# Patient Record
Sex: FEMALE | Race: WHITE | NOT HISPANIC OR LATINO | Employment: FULL TIME | URBAN - NONMETROPOLITAN AREA
[De-identification: names, ages, dates, MRNs, and addresses within clinical notes are randomized per-mention and may not be internally consistent; named-entity substitution may affect disease eponyms.]

---

## 2017-01-12 PROBLEM — N20.1 LEFT URETERAL CALCULUS: Status: ACTIVE | Noted: 2017-01-12

## 2017-03-06 DIAGNOSIS — N20.1 LEFT URETERAL STONE: ICD-10-CM

## 2017-03-06 LAB
PARATHYROID HORMONE INTACT: 47.5 PG/ML (ref 15–65)
URIC ACID, SERUM: 2.9 MG/DL (ref 2.4–5.7)

## 2018-07-11 ENCOUNTER — TRANSCRIBE ORDERS (OUTPATIENT)
Dept: ADMINISTRATIVE | Facility: HOSPITAL | Age: 48
End: 2018-07-11

## 2018-07-11 DIAGNOSIS — R30.0 DYSURIA: ICD-10-CM

## 2018-07-11 DIAGNOSIS — R10.811 RIGHT UPPER QUADRANT ABDOMINAL TENDERNESS, REBOUND TENDERNESS PRESENCE NOT SPECIFIED: Primary | ICD-10-CM

## 2018-07-12 ENCOUNTER — HOSPITAL ENCOUNTER (OUTPATIENT)
Dept: ULTRASOUND IMAGING | Facility: HOSPITAL | Age: 48
Discharge: HOME | End: 2018-07-12
Admitting: PHYSICIAN ASSISTANT

## 2018-07-12 ENCOUNTER — HOSPITAL ENCOUNTER (OUTPATIENT)
Dept: NUCLEAR MEDICINE | Facility: HOSPITAL | Age: 48
Discharge: HOME | End: 2018-07-12

## 2018-07-12 DIAGNOSIS — R30.0 DYSURIA: ICD-10-CM

## 2018-07-12 DIAGNOSIS — R10.811 RIGHT UPPER QUADRANT ABDOMINAL TENDERNESS, REBOUND TENDERNESS PRESENCE NOT SPECIFIED: ICD-10-CM

## 2018-07-12 PROCEDURE — 78226 HEPATOBILIARY SYSTEM IMAGING: CPT

## 2018-07-12 PROCEDURE — 0 TECHNETIUM TC 99M MEBROFENIN KIT: Performed by: PHYSICIAN ASSISTANT

## 2018-07-12 PROCEDURE — A9537 TC99M MEBROFENIN: HCPCS | Performed by: PHYSICIAN ASSISTANT

## 2018-07-12 PROCEDURE — 76705 ECHO EXAM OF ABDOMEN: CPT

## 2018-07-12 RX ORDER — KIT FOR THE PREPARATION OF TECHNETIUM TC 99M MEBROFENIN 45 MG/10ML
1 INJECTION, POWDER, LYOPHILIZED, FOR SOLUTION INTRAVENOUS
Status: COMPLETED | OUTPATIENT
Start: 2018-07-12 | End: 2018-07-12

## 2018-07-12 RX ADMIN — MEBROFENIN 1 DOSE: 45 INJECTION, POWDER, LYOPHILIZED, FOR SOLUTION INTRAVENOUS at 15:00

## 2018-08-23 ENCOUNTER — APPOINTMENT (OUTPATIENT)
Dept: PREADMISSION TESTING | Facility: HOSPITAL | Age: 48
End: 2018-08-23

## 2018-08-23 VITALS
OXYGEN SATURATION: 98 % | DIASTOLIC BLOOD PRESSURE: 73 MMHG | WEIGHT: 183.2 LBS | RESPIRATION RATE: 18 BRPM | HEART RATE: 80 BPM | HEIGHT: 63 IN | BODY MASS INDEX: 32.46 KG/M2 | SYSTOLIC BLOOD PRESSURE: 111 MMHG

## 2018-08-23 LAB
ALBUMIN SERPL-MCNC: 4.1 G/DL (ref 3.5–5)
ALBUMIN/GLOB SERPL: 1.6 G/DL (ref 1.1–2.5)
ALP SERPL-CCNC: 49 U/L (ref 24–120)
ALT SERPL W P-5'-P-CCNC: 25 U/L (ref 0–54)
AMYLASE SERPL-CCNC: 65 U/L (ref 30–110)
ANION GAP SERPL CALCULATED.3IONS-SCNC: 6 MMOL/L (ref 4–13)
AST SERPL-CCNC: 17 U/L (ref 7–45)
BASOPHILS # BLD AUTO: 0.07 10*3/MM3 (ref 0–0.2)
BASOPHILS NFR BLD AUTO: 0.8 % (ref 0–2)
BILIRUB SERPL-MCNC: 0.3 MG/DL (ref 0.1–1)
BUN BLD-MCNC: 8 MG/DL (ref 5–21)
BUN/CREAT SERPL: 11.9 (ref 7–25)
CALCIUM SPEC-SCNC: 9 MG/DL (ref 8.4–10.4)
CHLORIDE SERPL-SCNC: 107 MMOL/L (ref 98–110)
CO2 SERPL-SCNC: 27 MMOL/L (ref 24–31)
CREAT BLD-MCNC: 0.67 MG/DL (ref 0.5–1.4)
DEPRECATED RDW RBC AUTO: 42 FL (ref 40–54)
EOSINOPHIL # BLD AUTO: 0.48 10*3/MM3 (ref 0–0.7)
EOSINOPHIL NFR BLD AUTO: 5.4 % (ref 0–4)
ERYTHROCYTE [DISTWIDTH] IN BLOOD BY AUTOMATED COUNT: 13 % (ref 12–15)
GFR SERPL CREATININE-BSD FRML MDRD: 94 ML/MIN/1.73
GLOBULIN UR ELPH-MCNC: 2.6 GM/DL
GLUCOSE BLD-MCNC: 118 MG/DL (ref 70–100)
HCT VFR BLD AUTO: 37.4 % (ref 37–47)
HGB BLD-MCNC: 13.3 G/DL (ref 12–16)
IMM GRANULOCYTES # BLD: 0.03 10*3/MM3 (ref 0–0.03)
IMM GRANULOCYTES NFR BLD: 0.3 % (ref 0–5)
LIPASE SERPL-CCNC: 147 U/L (ref 23–203)
LYMPHOCYTES # BLD AUTO: 3.07 10*3/MM3 (ref 0.72–4.86)
LYMPHOCYTES NFR BLD AUTO: 34.8 % (ref 15–45)
MCH RBC QN AUTO: 31.4 PG (ref 28–32)
MCHC RBC AUTO-ENTMCNC: 35.6 G/DL (ref 33–36)
MCV RBC AUTO: 88.2 FL (ref 82–98)
MONOCYTES # BLD AUTO: 0.44 10*3/MM3 (ref 0.19–1.3)
MONOCYTES NFR BLD AUTO: 5 % (ref 4–12)
NEUTROPHILS # BLD AUTO: 4.74 10*3/MM3 (ref 1.87–8.4)
NEUTROPHILS NFR BLD AUTO: 53.7 % (ref 39–78)
NRBC BLD MANUAL-RTO: 0 /100 WBC (ref 0–0)
PLATELET # BLD AUTO: 286 10*3/MM3 (ref 130–400)
PMV BLD AUTO: 9.2 FL (ref 6–12)
POTASSIUM BLD-SCNC: 3.6 MMOL/L (ref 3.5–5.3)
PROT SERPL-MCNC: 6.7 G/DL (ref 6.3–8.7)
RBC # BLD AUTO: 4.24 10*6/MM3 (ref 4.2–5.4)
SODIUM BLD-SCNC: 140 MMOL/L (ref 135–145)
WBC NRBC COR # BLD: 8.83 10*3/MM3 (ref 4.8–10.8)

## 2018-08-23 PROCEDURE — 85025 COMPLETE CBC W/AUTO DIFF WBC: CPT | Performed by: SPECIALIST

## 2018-08-23 PROCEDURE — 36415 COLL VENOUS BLD VENIPUNCTURE: CPT

## 2018-08-23 PROCEDURE — 83690 ASSAY OF LIPASE: CPT | Performed by: SPECIALIST

## 2018-08-23 PROCEDURE — 82150 ASSAY OF AMYLASE: CPT | Performed by: SPECIALIST

## 2018-08-23 PROCEDURE — 80053 COMPREHEN METABOLIC PANEL: CPT | Performed by: SPECIALIST

## 2018-08-23 RX ORDER — DEXLANSOPRAZOLE 30 MG/1
30 CAPSULE, DELAYED RELEASE ORAL DAILY
COMMUNITY

## 2018-08-23 RX ORDER — DEXTROAMPHETAMINE SACCHARATE, AMPHETAMINE ASPARTATE, DEXTROAMPHETAMINE SULFATE AND AMPHETAMINE SULFATE 5; 5; 5; 5 MG/1; MG/1; MG/1; MG/1
20 TABLET ORAL DAILY
COMMUNITY

## 2018-08-23 RX ORDER — TOPIRAMATE 50 MG/1
50 TABLET, FILM COATED ORAL DAILY
COMMUNITY

## 2018-08-23 RX ORDER — CITALOPRAM 40 MG/1
40 TABLET ORAL DAILY
COMMUNITY

## 2018-08-23 RX ORDER — LEVOTHYROXINE SODIUM 0.07 MG/1
75 TABLET ORAL DAILY
COMMUNITY

## 2018-08-23 NOTE — DISCHARGE INSTRUCTIONS
DAY OF SURGERY INSTRUCTIONS        YOUR SURGEON: dr mcallister    PROCEDURE: ***LAPAROSCOPIC CHOLECYSTECTOMY WITH CHOLANGIOGRAM  LAPAROSCOPIC APPENDECTOMY    DATE OF SURGERY: ***8/31/2018    ARRIVAL TIME: AS DIRECTED BY OFFICE    DAY OF SURGERY TAKE ONLY THESE MEDICATIONS UNLESS OTHERWISE INSTRUCTED BY YOUR PHYSICIAN: ***none        MANAGING PAIN AFTER SURGERY    We know you are probably wondering what your pain will be like after surgery.  Following surgery it is unrealistic to expect you will not have pain.   Pain is how our bodies let us know that something is wrong or cautions us to be careful.  That said, our goal is to make your pain tolerable.    Methods we may use to treat your pain include (oral or IV medications, PCAs, epidurals, nerve blocks, etc.)   While some procedures require IV pain medications for a short time after surgery, transitioning to pain medications by mouth allows for better management of pain.   Your nurse will encourage you to take oral pain medications whenever possible.  IV medications work almost immediately, but only last a short while.  Taking medications by mouth allows for a more constant level of medication in your blood stream for a longer period of time.      Once your pain is out of control it is harder to get back under control.  It is important you are aware when your next dose of pain medication is due.  If you are admitted, your nurse may write the time of your next dose on the white board in your room to help you remember.      We are interested in your pain and encourage you to inform us about aggravating factors during your visit.   Many times a simple repositioning every few hours can make a big difference.    If your physician says it is okay, do not let your pain prevent you from getting out of bed. Be sure to call your nurse for assistance prior to getting up so you do not fall.      Before surgery, please decide your tolerable pain goal.  These faces help describe  the pain ratings we use on a 0-10 scale.   Be prepared to tell us your goal and whether or not you take pain or anxiety medications at home.          BEFORE YOU COME TO THE HOSPITAL  (Pre-op instructions)  • Do not eat, drink, smoke or chew gum after midnight the night before surgery.  This also includes no mints.  • Morning of surgery take only the medicines you have been instructed with a sip of water unless otherwise instructed  by your physician.  • Do not shave, wear makeup or dark nail polish.  • Remove all jewelry including rings.  • Leave anything you consider valuable at home.  • Leave your suitcase in the car until after your surgery.  • Bring the following with you if applicable:  o Picture ID and insurance, Medicare or Medicaid cards  o Co-pay/deductible required by insurance (cash, check, credit card)  o Copy of advance directive, living will or power-of- documents if not brought to PAT  o CPAP or BIPAP mask and tubing  o Relaxation aids (MP3 player, book, magazine)  • On the day of surgery check in at registration located at the main entrance of the hospital.       Outpatient Surgery Guidelines, Adult  Outpatient procedures are those for which the person having the procedure is allowed to go home the same day as the procedure. Various procedures are done on an outpatient basis. You should follow some general guidelines if you will be having an outpatient procedure.  LET YOUR HEALTH CARE PROVIDER KNOW ABOUT:  · Any allergies you have.  · All medicines you are taking, including vitamins, herbs, eye drops, creams, and over-the-counter medicines.  · Previous problems you or members of your family have had with the use of anesthetics.  · Any blood disorders you have.  · Previous surgeries you have had.  · Medical conditions you have.  RISKS AND COMPLICATIONS  Your health care provider will discuss possible risks and complications with you before surgery. Common risks and complications include:     · Problems due to the use of anesthetics.  · Blood loss and replacement (does not apply to minor surgical procedures).  · Temporary increase in pain due to surgery.  · Uncorrected pain or problems that the surgery was meant to correct.  · Infection.  · New damage.  BEFORE THE PROCEDURE  · Ask your health care provider about changing or stopping your regular medicines. You may need to stop taking certain medicines in the days or weeks before the procedure.  · Stop smoking at least 2 weeks before surgery. This lowers your risk for complications during and after surgery. Ask your health care provider for help with this if needed.  · Eat your usual meals and a light supper the day before surgery. Continue fluid intake. Do not drink alcohol.  · Do not eat or drink after midnight the night before your surgery.   · Arrange for someone to take you home and to stay with you for 24 hours after the procedure. Medicine given for your procedure may affect your ability to drive or to care for yourself.  · Call your health care provider's office if you develop an illness or problem that may prevent you from safely having your procedure.  AFTER THE PROCEDURE  After surgery, you will be taken to a recovery area, where your progress will be monitored. If there are no complications, you will be allowed to go home when you are awake, stable, and taking fluids well. You may have numbness around the surgical site. Healing will take some time. You will have tenderness at the surgical site and may have some swelling and bruising. You may also have some nausea.  HOME CARE INSTRUCTIONS  · Do not drive for 24 hours, or as directed by your health care provider. Do not drive while taking prescription pain medicines.  · Do not drink alcohol for 24 hours.  · Do not make important decisions or sign legal documents for 24 hours.  · You may resume a normal diet and activities as directed.  · Do not lift anything heavier than 10 pounds (4.5 kg) or  play contact sports until your health care provider says it is okay.  · Change your bandages (dressings) as directed.  · Only take over-the-counter or prescription medicines as directed by your health care provider.  · Follow up with your health care provider as directed.  SEEK MEDICAL CARE IF:  · You have increased bleeding (more than a small spot) from the surgical site.  · You have redness, swelling, or increasing pain in the wound.  · You see pus coming from the wound.  · You have a fever.  · You notice a bad smell coming from the wound or dressing.  · You feel lightheaded or faint.  · You develop a rash.  · You have trouble breathing.  · You develop allergies.  MAKE SURE YOU:  · Understand these instructions.  · Will watch your condition.  · Will get help right away if you are not doing well or get worse.     This information is not intended to replace advice given to you by your health care provider. Make sure you discuss any questions you have with your health care provider.     Document Released: 09/12/2002 Document Revised: 05/03/2016 Document Reviewed: 05/22/2014  [employer] Interactive Patient Education ©2016 [employer]       Fall Prevention in Hospitals, Adult  As a hospital patient, your condition and the treatments you receive can increase your risk for falls. Some additional risk factors for falls in a hospital include:  · Being in an unfamiliar environment.  · Being on bed rest.  · Your surgery.  · Taking certain medicines.  · Your tubing requirements, such as intravenous (IV) therapy or catheters.  It is important that you learn how to decrease fall risks while at the hospital. Below are important tips that can help prevent falls.  SAFETY TIPS FOR PREVENTING FALLS  Talk about your risk of falling.  · Ask your health care provider why you are at risk for falling. Is it your medicine, illness, tubing placement, or something else?  · Make a plan with your health care provider to keep you safe from  falls.  · Ask your health care provider or pharmacist about side effects of your medicines. Some medicines can make you dizzy or affect your coordination.  Ask for help.  · Ask for help before getting out of bed. You may need to press your call button.  · Ask for assistance in getting safely to the toilet.  · Ask for a walker or cane to be put at your bedside. Ask that most of the side rails on your bed be placed up before your health care provider leaves the room.  · Ask family or friends to sit with you.  · Ask for things that are out of your reach, such as your glasses, hearing aids, telephone, bedside table, or call button.  Follow these tips to avoid falling:  · Stay lying or seated, rather than standing, while waiting for help.  · Wear rubber-soled slippers or shoes whenever you walk in the hospital.  · Avoid quick, sudden movements.  ¨ Change positions slowly.  ¨ Sit on the side of your bed before standing.  ¨ Stand up slowly and wait before you start to walk.  · Let your health care provider know if there is a spill on the floor.  · Pay careful attention to the medical equipment, electrical cords, and tubes around you.  · When you need help, use your call button by your bed or in the bathroom. Wait for one of your health care providers to help you.  · If you feel dizzy or unsure of your footing, return to bed and wait for assistance.  · Avoid being distracted by the TV, telephone, or another person in your room.  · Do not lean or support yourself on rolling objects, such as IV poles or bedside tables.     This information is not intended to replace advice given to you by your health care provider. Make sure you discuss any questions you have with your health care provider.     Document Released: 12/15/2001 Document Revised: 01/08/2016 Document Reviewed: 08/25/2013  [employer] Interactive Patient Education ©2016 [employer]       Surgical Site Infections FAQs  What is a Surgical Site Infection (SSI)?  A  surgical site infection is an infection that occurs after surgery in the part of the body where the surgery took place. Most patients who have surgery do not develop an infection. However, infections develop in about 1 to 3 out of every 100 patients who have surgery.  Some of the common symptoms of a surgical site infection are:  · Redness and pain around the area where you had surgery  · Drainage of cloudy fluid from your surgical wound  · Fever  Can SSIs be treated?  Yes. Most surgical site infections can be treated with antibiotics. The antibiotic given to you depends on the bacteria (germs) causing the infection. Sometimes patients with SSIs also need another surgery to treat the infection.  What are some of the things that hospitals are doing to prevent SSIs?  To prevent SSIs, doctors, nurses, and other healthcare providers:  · Clean their hands and arms up to their elbows with an antiseptic agent just before the surgery.  · Clean their hands with soap and water or an alcohol-based hand rub before and after caring for each patient.  · May remove some of your hair immediately before your surgery using electric clippers if the hair is in the same area where the procedure will occur. They should not shave you with a razor.  · Wear special hair covers, masks, gowns, and gloves during surgery to keep the surgery area clean.  · Give you antibiotics before your surgery starts. In most cases, you should get antibiotics within 60 minutes before the surgery starts and the antibiotics should be stopped within 24 hours after surgery.  · Clean the skin at the site of your surgery with a special soap that kills germs.  What can I do to help prevent SSIs?  Before your surgery:  · Tell your doctor about other medical problems you may have. Health problems such as allergies, diabetes, and obesity could affect your surgery and your treatment.  · Quit smoking. Patients who smoke get more infections. Talk to your doctor about how  you can quit before your surgery.  · Do not shave near where you will have surgery. Shaving with a razor can irritate your skin and make it easier to develop an infection.  At the time of your surgery:  · Speak up if someone tries to shave you with a razor before surgery. Ask why you need to be shaved and talk with your surgeon if you have any concerns.  · Ask if you will get antibiotics before surgery.  After your surgery:  · Make sure that your healthcare providers clean their hands before examining you, either with soap and water or an alcohol-based hand rub.  · If you do not see your providers clean their hands, please ask them to do so.  · Family and friends who visit you should not touch the surgical wound or dressings.  · Family and friends should clean their hands with soap and water or an alcohol-based hand rub before and after visiting you. If you do not see them clean their hands, ask them to clean their hands.  What do I need to do when I go home from the hospital?  · Before you go home, your doctor or nurse should explain everything you need to know about taking care of your wound. Make sure you understand how to care for your wound before you leave the hospital.  · Always clean your hands before and after caring for your wound.  · Before you go home, make sure you know who to contact if you have questions or problems after you get home.  · If you have any symptoms of an infection, such as redness and pain at the surgery site, drainage, or fever, call your doctor immediately.  If you have additional questions, please ask your doctor or nurse.  Developed and co-sponsored by The Society for Healthcare Epidemiology of [place] (SHEA); Infectious Diseases Society of [place] (IDSA); American Hospital Association; Association for Professionals in Infection Control and Epidemiology (APIC); Centers for Disease Control and Prevention (CDC); and The Joint Commission.     This information is not intended to replace  advice given to you by your health care provider. Make sure you discuss any questions you have with your health care provider.     Document Released: 12/23/2014 Document Revised: 01/08/2016 Document Reviewed: 03/02/2016  [employer] Interactive Patient Education ©2016 [employer]       [facility]  CHG 4% Patient Instruction Sheet    Preparing the Skin Before Surgery  Preparing or “prepping” skin before surgery can reduce the risk of infection at the surgical site. To make the process easier,[facility] has chosen 4% Chlorhexidine Gluconate (CHG) antiseptic solution.   The steps below outline the prepping process and should be carefully followed.                                                                                                                                                      Prep the skin at the following time(s):                                                      We recommend you shower the night before surgery, and again the morning of surgery with the 4% CHG antiseptic solution using half of the bottle and a cloth each time.  Dress in clean clothes/sleepwear after showering.  See instructions below for application.          Do not apply any lotions or moisturizers.       Do not shave the area to be prepped for at least 2 days prior to surgery.    Clipping the hair may be done immediately prior to your surgery at the hospital    if needed.    Directions:  Thoroughly rinse your body with water.  Apply 4% CHG to a cloth and wash skin gently, paying special attention to the operative site.  Rinse again thoroughly.  Once you have begun using this product do not apply anything else to your skin. If itching or redness persists, rinse affected areas and discontinue use.    When using this product:  • Keep out of eyes, ears, and mouth.  • If solution should contact these areas, rinse out promptly and thoroughly with water.  • For external use only.  • Do not use in genital area, on your face or  head.      PATIENT/FAMILY/RESPONSIBLE PARTY VERBALIZES UNDERSTANDING OF ABOVE EDUCATION.  COPY OF PAIN SCALE GIVEN AND REVIEWED WITH VERBALIZED UNDERSTANDING.

## 2018-08-31 ENCOUNTER — ANESTHESIA (OUTPATIENT)
Dept: PERIOP | Facility: HOSPITAL | Age: 48
End: 2018-08-31

## 2018-08-31 ENCOUNTER — ANESTHESIA EVENT (OUTPATIENT)
Dept: PERIOP | Facility: HOSPITAL | Age: 48
End: 2018-08-31

## 2018-08-31 ENCOUNTER — HOSPITAL ENCOUNTER (OUTPATIENT)
Facility: HOSPITAL | Age: 48
Setting detail: HOSPITAL OUTPATIENT SURGERY
Discharge: HOME | End: 2018-08-31
Attending: SPECIALIST | Admitting: SPECIALIST

## 2018-08-31 VITALS
DIASTOLIC BLOOD PRESSURE: 66 MMHG | OXYGEN SATURATION: 97 % | TEMPERATURE: 98.7 F | HEART RATE: 89 BPM | SYSTOLIC BLOOD PRESSURE: 99 MMHG | RESPIRATION RATE: 16 BRPM

## 2018-08-31 DIAGNOSIS — R52 PAIN: ICD-10-CM

## 2018-08-31 LAB — B-HCG UR QL: NEGATIVE

## 2018-08-31 PROCEDURE — 25010000002 ONDANSETRON PER 1 MG: Performed by: NURSE ANESTHETIST, CERTIFIED REGISTERED

## 2018-08-31 PROCEDURE — 88304 TISSUE EXAM BY PATHOLOGIST: CPT | Performed by: SPECIALIST

## 2018-08-31 PROCEDURE — 25010000002 PROPOFOL 10 MG/ML EMULSION: Performed by: NURSE ANESTHETIST, CERTIFIED REGISTERED

## 2018-08-31 PROCEDURE — 25010000002 FENTANYL CITRATE (PF) 100 MCG/2ML SOLUTION: Performed by: ANESTHESIOLOGY

## 2018-08-31 PROCEDURE — 25010000002 DEXAMETHASONE PER 1 MG: Performed by: ANESTHESIOLOGY

## 2018-08-31 PROCEDURE — 25010000002 DEXAMETHASONE PER 1 MG: Performed by: NURSE ANESTHETIST, CERTIFIED REGISTERED

## 2018-08-31 PROCEDURE — 81025 URINE PREGNANCY TEST: CPT | Performed by: SPECIALIST

## 2018-08-31 PROCEDURE — 25010000002 ONDANSETRON PER 1 MG: Performed by: ANESTHESIOLOGY

## 2018-08-31 PROCEDURE — 25010000002 FENTANYL CITRATE (PF) 250 MCG/5ML SOLUTION: Performed by: NURSE ANESTHETIST, CERTIFIED REGISTERED

## 2018-08-31 PROCEDURE — 25010000002 MIDAZOLAM PER 1 MG: Performed by: ANESTHESIOLOGY

## 2018-08-31 RX ORDER — LIDOCAINE HYDROCHLORIDE 20 MG/ML
INJECTION, SOLUTION INFILTRATION; PERINEURAL AS NEEDED
Status: DISCONTINUED | OUTPATIENT
Start: 2018-08-31 | End: 2018-08-31 | Stop reason: SURG

## 2018-08-31 RX ORDER — ROCURONIUM BROMIDE 10 MG/ML
INJECTION, SOLUTION INTRAVENOUS AS NEEDED
Status: DISCONTINUED | OUTPATIENT
Start: 2018-08-31 | End: 2018-08-31 | Stop reason: SURG

## 2018-08-31 RX ORDER — SODIUM CHLORIDE 9 MG/ML
INJECTION, SOLUTION INTRAVENOUS AS NEEDED
Status: DISCONTINUED | OUTPATIENT
Start: 2018-08-31 | End: 2018-08-31 | Stop reason: HOSPADM

## 2018-08-31 RX ORDER — METOCLOPRAMIDE HYDROCHLORIDE 5 MG/ML
5 INJECTION INTRAMUSCULAR; INTRAVENOUS
Status: DISCONTINUED | OUTPATIENT
Start: 2018-08-31 | End: 2018-08-31 | Stop reason: HOSPADM

## 2018-08-31 RX ORDER — ACETAMINOPHEN 500 MG
1000 TABLET ORAL ONCE
Status: COMPLETED | OUTPATIENT
Start: 2018-08-31 | End: 2018-08-31

## 2018-08-31 RX ORDER — DEXAMETHASONE SODIUM PHOSPHATE 4 MG/ML
4 INJECTION, SOLUTION INTRA-ARTICULAR; INTRALESIONAL; INTRAMUSCULAR; INTRAVENOUS; SOFT TISSUE ONCE AS NEEDED
Status: COMPLETED | OUTPATIENT
Start: 2018-08-31 | End: 2018-08-31

## 2018-08-31 RX ORDER — ONDANSETRON 2 MG/ML
4 INJECTION INTRAMUSCULAR; INTRAVENOUS ONCE AS NEEDED
Status: COMPLETED | OUTPATIENT
Start: 2018-08-31 | End: 2018-08-31

## 2018-08-31 RX ORDER — LIDOCAINE HYDROCHLORIDE 40 MG/ML
SOLUTION TOPICAL AS NEEDED
Status: DISCONTINUED | OUTPATIENT
Start: 2018-08-31 | End: 2018-08-31 | Stop reason: SURG

## 2018-08-31 RX ORDER — HYDROCODONE BITARTRATE AND ACETAMINOPHEN 5; 325 MG/1; MG/1
1 TABLET ORAL ONCE AS NEEDED
Status: DISCONTINUED | OUTPATIENT
Start: 2018-08-31 | End: 2018-08-31 | Stop reason: HOSPADM

## 2018-08-31 RX ORDER — PROPOFOL 10 MG/ML
VIAL (ML) INTRAVENOUS AS NEEDED
Status: DISCONTINUED | OUTPATIENT
Start: 2018-08-31 | End: 2018-08-31 | Stop reason: SURG

## 2018-08-31 RX ORDER — SODIUM CHLORIDE, SODIUM LACTATE, POTASSIUM CHLORIDE, CALCIUM CHLORIDE 600; 310; 30; 20 MG/100ML; MG/100ML; MG/100ML; MG/100ML
1000 INJECTION, SOLUTION INTRAVENOUS CONTINUOUS
Status: DISCONTINUED | OUTPATIENT
Start: 2018-08-31 | End: 2018-08-31 | Stop reason: HOSPADM

## 2018-08-31 RX ORDER — SODIUM CHLORIDE 0.9 % (FLUSH) 0.9 %
1-10 SYRINGE (ML) INJECTION AS NEEDED
Status: DISCONTINUED | OUTPATIENT
Start: 2018-08-31 | End: 2018-08-31 | Stop reason: HOSPADM

## 2018-08-31 RX ORDER — MAGNESIUM HYDROXIDE 1200 MG/15ML
LIQUID ORAL AS NEEDED
Status: DISCONTINUED | OUTPATIENT
Start: 2018-08-31 | End: 2018-08-31 | Stop reason: HOSPADM

## 2018-08-31 RX ORDER — OXYCODONE AND ACETAMINOPHEN 10; 325 MG/1; MG/1
1 TABLET ORAL ONCE AS NEEDED
Status: COMPLETED | OUTPATIENT
Start: 2018-08-31 | End: 2018-08-31

## 2018-08-31 RX ORDER — OXYCODONE AND ACETAMINOPHEN 7.5; 325 MG/1; MG/1
2 TABLET ORAL ONCE AS NEEDED
Status: DISCONTINUED | OUTPATIENT
Start: 2018-08-31 | End: 2018-08-31 | Stop reason: HOSPADM

## 2018-08-31 RX ORDER — DEXAMETHASONE SODIUM PHOSPHATE 4 MG/ML
INJECTION, SOLUTION INTRA-ARTICULAR; INTRALESIONAL; INTRAMUSCULAR; INTRAVENOUS; SOFT TISSUE AS NEEDED
Status: DISCONTINUED | OUTPATIENT
Start: 2018-08-31 | End: 2018-08-31 | Stop reason: SURG

## 2018-08-31 RX ORDER — SODIUM CHLORIDE, SODIUM LACTATE, POTASSIUM CHLORIDE, CALCIUM CHLORIDE 600; 310; 30; 20 MG/100ML; MG/100ML; MG/100ML; MG/100ML
100 INJECTION, SOLUTION INTRAVENOUS CONTINUOUS
Status: DISCONTINUED | OUTPATIENT
Start: 2018-08-31 | End: 2018-08-31 | Stop reason: HOSPADM

## 2018-08-31 RX ORDER — FENTANYL CITRATE 50 UG/ML
25 INJECTION, SOLUTION INTRAMUSCULAR; INTRAVENOUS AS NEEDED
Status: DISCONTINUED | OUTPATIENT
Start: 2018-08-31 | End: 2018-08-31 | Stop reason: HOSPADM

## 2018-08-31 RX ORDER — LABETALOL HYDROCHLORIDE 5 MG/ML
5 INJECTION, SOLUTION INTRAVENOUS
Status: DISCONTINUED | OUTPATIENT
Start: 2018-08-31 | End: 2018-08-31 | Stop reason: HOSPADM

## 2018-08-31 RX ORDER — FENTANYL CITRATE 50 UG/ML
INJECTION, SOLUTION INTRAMUSCULAR; INTRAVENOUS AS NEEDED
Status: DISCONTINUED | OUTPATIENT
Start: 2018-08-31 | End: 2018-08-31 | Stop reason: SURG

## 2018-08-31 RX ORDER — ONDANSETRON 2 MG/ML
INJECTION INTRAMUSCULAR; INTRAVENOUS AS NEEDED
Status: DISCONTINUED | OUTPATIENT
Start: 2018-08-31 | End: 2018-08-31 | Stop reason: SURG

## 2018-08-31 RX ORDER — MIDAZOLAM HYDROCHLORIDE 1 MG/ML
2 INJECTION INTRAMUSCULAR; INTRAVENOUS
Status: DISCONTINUED | OUTPATIENT
Start: 2018-08-31 | End: 2018-08-31 | Stop reason: HOSPADM

## 2018-08-31 RX ORDER — MIDAZOLAM HYDROCHLORIDE 1 MG/ML
1 INJECTION INTRAMUSCULAR; INTRAVENOUS
Status: DISCONTINUED | OUTPATIENT
Start: 2018-08-31 | End: 2018-08-31 | Stop reason: HOSPADM

## 2018-08-31 RX ORDER — NALOXONE HCL 0.4 MG/ML
0.4 VIAL (ML) INJECTION AS NEEDED
Status: DISCONTINUED | OUTPATIENT
Start: 2018-08-31 | End: 2018-08-31 | Stop reason: HOSPADM

## 2018-08-31 RX ORDER — SODIUM CHLORIDE 0.9 % (FLUSH) 0.9 %
3 SYRINGE (ML) INJECTION AS NEEDED
Status: DISCONTINUED | OUTPATIENT
Start: 2018-08-31 | End: 2018-08-31 | Stop reason: HOSPADM

## 2018-08-31 RX ORDER — IBUPROFEN 600 MG/1
600 TABLET ORAL ONCE AS NEEDED
Status: DISCONTINUED | OUTPATIENT
Start: 2018-08-31 | End: 2018-08-31 | Stop reason: HOSPADM

## 2018-08-31 RX ORDER — FAMOTIDINE 10 MG/ML
20 INJECTION, SOLUTION INTRAVENOUS
Status: DISCONTINUED | OUTPATIENT
Start: 2018-08-31 | End: 2018-08-31 | Stop reason: HOSPADM

## 2018-08-31 RX ORDER — MEPERIDINE HYDROCHLORIDE 25 MG/ML
12.5 INJECTION INTRAMUSCULAR; INTRAVENOUS; SUBCUTANEOUS
Status: DISCONTINUED | OUTPATIENT
Start: 2018-08-31 | End: 2018-08-31 | Stop reason: HOSPADM

## 2018-08-31 RX ORDER — BUPIVACAINE HYDROCHLORIDE AND EPINEPHRINE 5; 5 MG/ML; UG/ML
INJECTION, SOLUTION PERINEURAL AS NEEDED
Status: DISCONTINUED | OUTPATIENT
Start: 2018-08-31 | End: 2018-08-31 | Stop reason: HOSPADM

## 2018-08-31 RX ORDER — HYDROCODONE BITARTRATE AND ACETAMINOPHEN 7.5; 325 MG/1; MG/1
1 TABLET ORAL EVERY 4 HOURS PRN
Qty: 30 TABLET | Refills: 0 | Status: SHIPPED | OUTPATIENT
Start: 2018-08-31

## 2018-08-31 RX ORDER — IPRATROPIUM BROMIDE AND ALBUTEROL SULFATE 2.5; .5 MG/3ML; MG/3ML
3 SOLUTION RESPIRATORY (INHALATION) ONCE AS NEEDED
Status: DISCONTINUED | OUTPATIENT
Start: 2018-08-31 | End: 2018-08-31 | Stop reason: HOSPADM

## 2018-08-31 RX ADMIN — FENTANYL CITRATE 50 MCG: 50 INJECTION, SOLUTION INTRAMUSCULAR; INTRAVENOUS at 10:15

## 2018-08-31 RX ADMIN — MIDAZOLAM HYDROCHLORIDE 2 MG: 1 INJECTION, SOLUTION INTRAMUSCULAR; INTRAVENOUS at 08:11

## 2018-08-31 RX ADMIN — FAMOTIDINE 20 MG: 10 INJECTION INTRAVENOUS at 08:11

## 2018-08-31 RX ADMIN — DEXAMETHASONE SODIUM PHOSPHATE 8 MG: 4 INJECTION, SOLUTION INTRAMUSCULAR; INTRAVENOUS at 08:55

## 2018-08-31 RX ADMIN — ONDANSETRON HYDROCHLORIDE 4 MG: 2 SOLUTION INTRAMUSCULAR; INTRAVENOUS at 08:55

## 2018-08-31 RX ADMIN — LIDOCAINE HYDROCHLORIDE 50 MG: 20 INJECTION, SOLUTION INFILTRATION; PERINEURAL at 08:55

## 2018-08-31 RX ADMIN — OXYCODONE HYDROCHLORIDE AND ACETAMINOPHEN 1 TABLET: 10; 325 TABLET ORAL at 10:23

## 2018-08-31 RX ADMIN — LIDOCAINE HYDROCHLORIDE 0.5 ML: 10 INJECTION, SOLUTION EPIDURAL; INFILTRATION; INTRACAUDAL; PERINEURAL at 07:45

## 2018-08-31 RX ADMIN — Medication 1 G: at 09:00

## 2018-08-31 RX ADMIN — ROCURONIUM BROMIDE 25 MG: 10 INJECTION INTRAVENOUS at 08:55

## 2018-08-31 RX ADMIN — LIDOCAINE HYDROCHLORIDE 1 EACH: 40 SOLUTION TOPICAL at 08:56

## 2018-08-31 RX ADMIN — DEXAMETHASONE SODIUM PHOSPHATE 4 MG: 4 INJECTION, SOLUTION INTRA-ARTICULAR; INTRALESIONAL; INTRAMUSCULAR; INTRAVENOUS; SOFT TISSUE at 08:11

## 2018-08-31 RX ADMIN — SODIUM CHLORIDE, POTASSIUM CHLORIDE, SODIUM LACTATE AND CALCIUM CHLORIDE 1000 ML: 600; 310; 30; 20 INJECTION, SOLUTION INTRAVENOUS at 07:45

## 2018-08-31 RX ADMIN — MEPERIDINE HYDROCHLORIDE 12.5 MG: 25 INJECTION INTRAMUSCULAR; INTRAVENOUS; SUBCUTANEOUS at 10:31

## 2018-08-31 RX ADMIN — ACETAMINOPHEN 1000 MG: 500 TABLET, FILM COATED ORAL at 08:10

## 2018-08-31 RX ADMIN — SODIUM CHLORIDE, POTASSIUM CHLORIDE, SODIUM LACTATE AND CALCIUM CHLORIDE: 600; 310; 30; 20 INJECTION, SOLUTION INTRAVENOUS at 09:55

## 2018-08-31 RX ADMIN — PROPOFOL 200 MG: 10 INJECTION, EMULSION INTRAVENOUS at 08:55

## 2018-08-31 RX ADMIN — FENTANYL CITRATE 100 MCG: 50 INJECTION INTRAMUSCULAR; INTRAVENOUS at 08:55

## 2018-08-31 RX ADMIN — SUGAMMADEX 200 MG: 100 INJECTION, SOLUTION INTRAVENOUS at 09:51

## 2018-08-31 RX ADMIN — FENTANYL CITRATE 150 MCG: 50 INJECTION INTRAMUSCULAR; INTRAVENOUS at 09:34

## 2018-08-31 RX ADMIN — ONDANSETRON 4 MG: 2 INJECTION, SOLUTION INTRAMUSCULAR; INTRAVENOUS at 10:42

## 2018-09-06 LAB
CYTO UR: NORMAL
LAB AP CASE REPORT: NORMAL
LAB AP CLINICAL INFORMATION: NORMAL
PATH REPORT.FINAL DX SPEC: NORMAL
PATH REPORT.GROSS SPEC: NORMAL

## 2019-10-15 ENCOUNTER — TRANSCRIBE ORDERS (OUTPATIENT)
Dept: ADMINISTRATIVE | Facility: HOSPITAL | Age: 49
End: 2019-10-15

## 2019-10-15 DIAGNOSIS — R10.2 PELVIC PAIN: Primary | ICD-10-CM

## 2019-10-16 ENCOUNTER — HOSPITAL ENCOUNTER (OUTPATIENT)
Dept: CT IMAGING | Facility: HOSPITAL | Age: 49
Discharge: HOME | End: 2019-10-16
Admitting: OBSTETRICS & GYNECOLOGY

## 2019-10-16 DIAGNOSIS — R10.2 PELVIC PAIN: ICD-10-CM

## 2019-10-16 PROCEDURE — 74177 CT ABD & PELVIS W/CONTRAST: CPT

## 2019-10-16 PROCEDURE — 0 IOPAMIDOL PER 1 ML: Performed by: OBSTETRICS & GYNECOLOGY

## 2019-10-16 RX ADMIN — IOPAMIDOL 100 ML: 755 INJECTION, SOLUTION INTRAVENOUS at 12:38

## 2019-12-04 ENCOUNTER — OFFICE VISIT (OUTPATIENT)
Dept: GASTROENTEROLOGY | Facility: CLINIC | Age: 49
End: 2019-12-04

## 2019-12-04 VITALS
TEMPERATURE: 97 F | DIASTOLIC BLOOD PRESSURE: 80 MMHG | HEART RATE: 70 BPM | WEIGHT: 160 LBS | BODY MASS INDEX: 28.71 KG/M2 | SYSTOLIC BLOOD PRESSURE: 120 MMHG | OXYGEN SATURATION: 98 %

## 2019-12-04 DIAGNOSIS — R10.9 ABDOMINAL PAIN, UNSPECIFIED ABDOMINAL LOCATION: ICD-10-CM

## 2019-12-04 DIAGNOSIS — R19.8 CHANGE IN BOWEL FUNCTION: Primary | ICD-10-CM

## 2019-12-04 PROCEDURE — 99204 OFFICE O/P NEW MOD 45 MIN: CPT | Performed by: NURSE PRACTITIONER

## 2019-12-04 RX ORDER — SODIUM, POTASSIUM,MAG SULFATES 17.5-3.13G
SOLUTION, RECONSTITUTED, ORAL ORAL
Qty: 1 BOTTLE | Refills: 0 | Status: ON HOLD | OUTPATIENT
Start: 2019-12-04 | End: 2019-12-27

## 2019-12-04 NOTE — H&P (VIEW-ONLY)
Chief Complaint   Patient presents with   • Abdominal Pain     stomach pian feeling really tired bowel alternate from diarrhea constipation       PCP: Fab Cruz MD  REFER: Fab Cruz MD    Subjective     HPI      End of 2019 onset of fatigue and abdominal pain.  Pain location varies from RLQ to lower abdomen and occasionally in LLQ.  No aggravating or alleviating factors to pain.  Pain unrelated to eating.   She has undergone negative GYN evaluation for pain.  She has been treated for diverticulitis without relief of pain.  Bowels will not move for up to 3 days then she has one day with multiple loose BM.  This is change in bowel for her, previous bowel habit described as daily formed stool.  Associated bloating.   She has gained weight over past few weeks.  Frequent nausea.   She has experienced intermittent fevers over past few     Unremarkable CT a/p Oct 2019    Past Medical History:   Diagnosis Date   • Arthritis    • Disease of thyroid gland    • H/O tubal ligation    • Hypertension    • Migraines        Past Surgical History:   Procedure Laterality Date   • APPENDECTOMY N/A 2018    Procedure: LAPAROSCOPIC APPENDECTOMY;  Surgeon: Yuliya Kumar MD;  Location: [facility] OR;  Service: General   •  SECTION      times  3   • CHOLECYSTECTOMY WITH INTRAOPERATIVE CHOLANGIOGRAM N/A 2018    Procedure: LAPAROSCOPIC CHOLECYSTECTOMY;  Surgeon: Yuliya Kumar MD;  Location: [facility] OR;  Service: General   • CYSTOSCOPY W/ LASER LITHOTRIPSY     • TENDON REPAIR      left hand   • TONSILLECTOMY         Outpatient Medications Marked as Taking for the 19 encounter (Office Visit) with Rodrigo Owens APRN   Medication Sig Dispense Refill   • amphetamine-dextroamphetamine (ADDERALL) 20 MG tablet Take 20 mg by mouth Daily.     • citalopram (CeleXA) 40 MG tablet Take 40 mg by mouth Daily.     • dexlansoprazole (DEXILANT) 30 MG capsule Take 30 mg by mouth Daily.     •  HYDROcodone-acetaminophen (NORCO) 7.5-325 MG per tablet Take 1 tablet by mouth Every 4 (Four) Hours As Needed for Moderate Pain  (Pain). 30 tablet 0   • levothyroxine (SYNTHROID, LEVOTHROID) 75 MCG tablet Take 75 mcg by mouth Daily.     • topiramate (TOPAMAX) 50 MG tablet Take 50 mg by mouth Daily.         Allergies   Allergen Reactions   • Penicillins Unknown (See Comments)     Unknown childhood reaction       Social History     Socioeconomic History   • Marital status: Legally      Spouse name: Not on file   • Number of children: Not on file   • Years of education: Not on file   • Highest education level: Not on file   Tobacco Use   • Smoking status: Current Every Day Smoker     Packs/day: 0.50     Years: 19.00     Pack years: 9.50     Types: Cigarettes   • Smokeless tobacco: Never Used   Substance and Sexual Activity   • Alcohol use: Yes     Comment: weekly   • Drug use: No   • Sexual activity: Defer       Family History   Problem Relation Age of Onset   • Diverticulitis Father    • Colon cancer Neg Hx        Review of Systems   Constitutional: Negative for fatigue, fever and unexpected weight change.   HENT: Negative for hearing loss, sore throat and voice change.    Eyes: Negative for visual disturbance.   Respiratory: Negative for cough, shortness of breath and wheezing.    Cardiovascular: Negative for chest pain and palpitations.   Gastrointestinal: Positive for abdominal pain. Negative for blood in stool and vomiting.   Endocrine: Negative for polydipsia and polyuria.   Genitourinary: Negative for difficulty urinating, dysuria, hematuria and urgency.   Musculoskeletal: Negative for joint swelling and myalgias.   Skin: Negative for color change, rash and wound.   Neurological: Negative for dizziness, tremors, seizures and syncope.   Hematological: Does not bruise/bleed easily.   Psychiatric/Behavioral: Negative for agitation and confusion. The patient is not nervous/anxious.        Objective      Vitals:    12/04/19 1359   BP: 120/80   Pulse: 70   Temp: 97 °F (36.1 °C)   SpO2: 98%   Weight: 72.6 kg (160 lb)     Body mass index is 28.71 kg/m².    Physical Exam   Constitutional: She is oriented to person, place, and time. She appears well-developed and well-nourished. She is cooperative.   HENT:   Head: Normocephalic and atraumatic.   Eyes: Conjunctivae are normal. Pupils are equal, round, and reactive to light. No scleral icterus.   Neck: Normal range of motion. Neck supple. No JVD present. No thyroid mass and no thyromegaly present.   Cardiovascular: Normal rate, regular rhythm and normal heart sounds. Exam reveals no gallop and no friction rub.   No murmur heard.  Pulmonary/Chest: Effort normal and breath sounds normal. No accessory muscle usage. No respiratory distress. She has no wheezes. She has no rales.   Abdominal: Soft. Normal appearance and bowel sounds are normal. She exhibits no distension, no ascites and no mass. There is no hepatosplenomegaly. There is no tenderness. There is no rebound and no guarding.   Musculoskeletal: Normal range of motion. She exhibits no edema or tenderness.     Vascular Status -  Her right foot exhibits normal foot vasculature  and no edema. Her left foot exhibits normal foot vasculature  and no edema.  Lymphadenopathy:     She has no cervical adenopathy.   Neurological: She is alert and oriented to person, place, and time. She has normal strength. Gait normal.   Skin: Skin is warm, dry and intact. No rash noted.       Imaging Results (Most Recent)     None          Body mass index is 28.71 kg/m².    Assessment/Plan     Fern was seen today for abdominal pain.    Diagnoses and all orders for this visit:    Change in bowel function  -     Case Request; Standing  -     Case Request    Abdominal pain, unspecified abdominal location    Other orders  -     sodium-potassium-magnesium sulfates (SUPREP BOWEL PREP KIT) 17.5-3.13-1.6 GM/177ML solution oral solution; Take as  directed        COLONOSCOPY WITH ANESTHESIA (N/A)     Avoid constipation  Limit caffeine  Recommend daily use of Miralax, adjust as needed    Patient is to continue all blood pressure and cardiac medications prior to procedure and has been advised to take medications morning of procedure   Pt verbalized understanding     Advised pt to stop ASA, use of NSAIDs, Fish Oil, and MV 5 days prior to procedure, per Dr Salamanca protocol.  Tylenol based products are ok to take.  Pt verbalized understanding.     All risks, benefits, alternatives, and indications of colonoscopy procedure have been discussed with the patient. Risks to include perforation of the colon requiring possible surgery or colostomy, risk of bleeding from biopsies or removal of colon tissue, possibility of missing a colon polyp or cancer, or adverse drug reaction.  Benefits to include the diagnosis and management of disease of the colon and rectum. Alternatives to include barium enema, radiographic evaluation, lab testing or no intervention. Pt verbalizes understanding and agrees to proceed with procedure.      Patient's Body mass index is 28.71 kg/m². BMI is above normal parameters. Recommendations include: no follow up.      There are no Patient Instructions on file for this visit.

## 2019-12-04 NOTE — PROGRESS NOTES
Chief Complaint   Patient presents with   • Abdominal Pain     stomach pian feeling really tired bowel alternate from diarrhea constipation       PCP: Fab Cruz MD  REFER: Fab Cruz MD    Subjective     HPI      End of 2019 onset of fatigue and abdominal pain.  Pain location varies from RLQ to lower abdomen and occasionally in LLQ.  No aggravating or alleviating factors to pain.  Pain unrelated to eating.   She has undergone negative GYN evaluation for pain.  She has been treated for diverticulitis without relief of pain.  Bowels will not move for up to 3 days then she has one day with multiple loose BM.  This is change in bowel for her, previous bowel habit described as daily formed stool.  Associated bloating.   She has gained weight over past few weeks.  Frequent nausea.   She has experienced intermittent fevers over past few     Unremarkable CT a/p Oct 2019    Past Medical History:   Diagnosis Date   • Arthritis    • Disease of thyroid gland    • H/O tubal ligation    • Hypertension    • Migraines        Past Surgical History:   Procedure Laterality Date   • APPENDECTOMY N/A 2018    Procedure: LAPAROSCOPIC APPENDECTOMY;  Surgeon: Yuliya Kumar MD;  Location: [facility] OR;  Service: General   •  SECTION      times  3   • CHOLECYSTECTOMY WITH INTRAOPERATIVE CHOLANGIOGRAM N/A 2018    Procedure: LAPAROSCOPIC CHOLECYSTECTOMY;  Surgeon: Yuliya Kumar MD;  Location: [facility] OR;  Service: General   • CYSTOSCOPY W/ LASER LITHOTRIPSY     • TENDON REPAIR      left hand   • TONSILLECTOMY         Outpatient Medications Marked as Taking for the 19 encounter (Office Visit) with Rodrigo Owens APRN   Medication Sig Dispense Refill   • amphetamine-dextroamphetamine (ADDERALL) 20 MG tablet Take 20 mg by mouth Daily.     • citalopram (CeleXA) 40 MG tablet Take 40 mg by mouth Daily.     • dexlansoprazole (DEXILANT) 30 MG capsule Take 30 mg by mouth Daily.     •  HYDROcodone-acetaminophen (NORCO) 7.5-325 MG per tablet Take 1 tablet by mouth Every 4 (Four) Hours As Needed for Moderate Pain  (Pain). 30 tablet 0   • levothyroxine (SYNTHROID, LEVOTHROID) 75 MCG tablet Take 75 mcg by mouth Daily.     • topiramate (TOPAMAX) 50 MG tablet Take 50 mg by mouth Daily.         Allergies   Allergen Reactions   • Penicillins Unknown (See Comments)     Unknown childhood reaction       Social History     Socioeconomic History   • Marital status: Legally      Spouse name: Not on file   • Number of children: Not on file   • Years of education: Not on file   • Highest education level: Not on file   Tobacco Use   • Smoking status: Current Every Day Smoker     Packs/day: 0.50     Years: 19.00     Pack years: 9.50     Types: Cigarettes   • Smokeless tobacco: Never Used   Substance and Sexual Activity   • Alcohol use: Yes     Comment: weekly   • Drug use: No   • Sexual activity: Defer       Family History   Problem Relation Age of Onset   • Diverticulitis Father    • Colon cancer Neg Hx        Review of Systems   Constitutional: Negative for fatigue, fever and unexpected weight change.   HENT: Negative for hearing loss, sore throat and voice change.    Eyes: Negative for visual disturbance.   Respiratory: Negative for cough, shortness of breath and wheezing.    Cardiovascular: Negative for chest pain and palpitations.   Gastrointestinal: Positive for abdominal pain. Negative for blood in stool and vomiting.   Endocrine: Negative for polydipsia and polyuria.   Genitourinary: Negative for difficulty urinating, dysuria, hematuria and urgency.   Musculoskeletal: Negative for joint swelling and myalgias.   Skin: Negative for color change, rash and wound.   Neurological: Negative for dizziness, tremors, seizures and syncope.   Hematological: Does not bruise/bleed easily.   Psychiatric/Behavioral: Negative for agitation and confusion. The patient is not nervous/anxious.        Objective      Vitals:    12/04/19 1359   BP: 120/80   Pulse: 70   Temp: 97 °F (36.1 °C)   SpO2: 98%   Weight: 72.6 kg (160 lb)     Body mass index is 28.71 kg/m².    Physical Exam   Constitutional: She is oriented to person, place, and time. She appears well-developed and well-nourished. She is cooperative.   HENT:   Head: Normocephalic and atraumatic.   Eyes: Conjunctivae are normal. Pupils are equal, round, and reactive to light. No scleral icterus.   Neck: Normal range of motion. Neck supple. No JVD present. No thyroid mass and no thyromegaly present.   Cardiovascular: Normal rate, regular rhythm and normal heart sounds. Exam reveals no gallop and no friction rub.   No murmur heard.  Pulmonary/Chest: Effort normal and breath sounds normal. No accessory muscle usage. No respiratory distress. She has no wheezes. She has no rales.   Abdominal: Soft. Normal appearance and bowel sounds are normal. She exhibits no distension, no ascites and no mass. There is no hepatosplenomegaly. There is no tenderness. There is no rebound and no guarding.   Musculoskeletal: Normal range of motion. She exhibits no edema or tenderness.     Vascular Status -  Her right foot exhibits normal foot vasculature  and no edema. Her left foot exhibits normal foot vasculature  and no edema.  Lymphadenopathy:     She has no cervical adenopathy.   Neurological: She is alert and oriented to person, place, and time. She has normal strength. Gait normal.   Skin: Skin is warm, dry and intact. No rash noted.       Imaging Results (Most Recent)     None          Body mass index is 28.71 kg/m².    Assessment/Plan     Fern was seen today for abdominal pain.    Diagnoses and all orders for this visit:    Change in bowel function  -     Case Request; Standing  -     Case Request    Abdominal pain, unspecified abdominal location    Other orders  -     sodium-potassium-magnesium sulfates (SUPREP BOWEL PREP KIT) 17.5-3.13-1.6 GM/177ML solution oral solution; Take as  directed        COLONOSCOPY WITH ANESTHESIA (N/A)     Avoid constipation  Limit caffeine  Recommend daily use of Miralax, adjust as needed    Patient is to continue all blood pressure and cardiac medications prior to procedure and has been advised to take medications morning of procedure   Pt verbalized understanding     Advised pt to stop ASA, use of NSAIDs, Fish Oil, and MV 5 days prior to procedure, per Dr Salamanca protocol.  Tylenol based products are ok to take.  Pt verbalized understanding.     All risks, benefits, alternatives, and indications of colonoscopy procedure have been discussed with the patient. Risks to include perforation of the colon requiring possible surgery or colostomy, risk of bleeding from biopsies or removal of colon tissue, possibility of missing a colon polyp or cancer, or adverse drug reaction.  Benefits to include the diagnosis and management of disease of the colon and rectum. Alternatives to include barium enema, radiographic evaluation, lab testing or no intervention. Pt verbalizes understanding and agrees to proceed with procedure.      Patient's Body mass index is 28.71 kg/m². BMI is above normal parameters. Recommendations include: no follow up.      There are no Patient Instructions on file for this visit.

## 2019-12-05 PROBLEM — R19.8 CHANGE IN BOWEL FUNCTION: Status: ACTIVE | Noted: 2019-12-05

## 2019-12-27 ENCOUNTER — TELEPHONE (OUTPATIENT)
Dept: GASTROENTEROLOGY | Facility: CLINIC | Age: 49
End: 2019-12-27

## 2019-12-27 ENCOUNTER — ANESTHESIA (OUTPATIENT)
Dept: GASTROENTEROLOGY | Facility: HOSPITAL | Age: 49
End: 2019-12-27

## 2019-12-27 ENCOUNTER — HOSPITAL ENCOUNTER (OUTPATIENT)
Facility: HOSPITAL | Age: 49
Setting detail: HOSPITAL OUTPATIENT SURGERY
Discharge: HOME | End: 2019-12-27
Attending: INTERNAL MEDICINE | Admitting: INTERNAL MEDICINE

## 2019-12-27 ENCOUNTER — ANESTHESIA EVENT (OUTPATIENT)
Dept: GASTROENTEROLOGY | Facility: HOSPITAL | Age: 49
End: 2019-12-27

## 2019-12-27 VITALS
WEIGHT: 157 LBS | SYSTOLIC BLOOD PRESSURE: 120 MMHG | HEART RATE: 90 BPM | DIASTOLIC BLOOD PRESSURE: 76 MMHG | BODY MASS INDEX: 27.82 KG/M2 | OXYGEN SATURATION: 94 % | TEMPERATURE: 97.6 F | RESPIRATION RATE: 18 BRPM | HEIGHT: 63 IN

## 2019-12-27 DIAGNOSIS — R19.8 CHANGE IN BOWEL FUNCTION: ICD-10-CM

## 2019-12-27 LAB — B-HCG UR QL: NEGATIVE

## 2019-12-27 PROCEDURE — 45378 DIAGNOSTIC COLONOSCOPY: CPT | Performed by: INTERNAL MEDICINE

## 2019-12-27 PROCEDURE — 81025 URINE PREGNANCY TEST: CPT | Performed by: ANESTHESIOLOGY

## 2019-12-27 PROCEDURE — 25010000002 PROPOFOL 10 MG/ML EMULSION: Performed by: NURSE ANESTHETIST, CERTIFIED REGISTERED

## 2019-12-27 RX ORDER — SODIUM CHLORIDE 9 MG/ML
500 INJECTION, SOLUTION INTRAVENOUS CONTINUOUS PRN
Status: DISCONTINUED | OUTPATIENT
Start: 2019-12-27 | End: 2019-12-27 | Stop reason: HOSPADM

## 2019-12-27 RX ORDER — PROPOFOL 10 MG/ML
VIAL (ML) INTRAVENOUS AS NEEDED
Status: DISCONTINUED | OUTPATIENT
Start: 2019-12-27 | End: 2019-12-27 | Stop reason: SURG

## 2019-12-27 RX ORDER — SODIUM CHLORIDE 0.9 % (FLUSH) 0.9 %
10 SYRINGE (ML) INJECTION AS NEEDED
Status: DISCONTINUED | OUTPATIENT
Start: 2019-12-27 | End: 2019-12-27 | Stop reason: HOSPADM

## 2019-12-27 RX ADMIN — PROPOFOL 300 MG: 10 INJECTION, EMULSION INTRAVENOUS at 10:02

## 2019-12-27 RX ADMIN — SODIUM CHLORIDE 500 ML: 9 INJECTION, SOLUTION INTRAVENOUS at 09:07

## 2019-12-27 NOTE — ANESTHESIA POSTPROCEDURE EVALUATION
Patient: Fern Noyola    Procedure Summary     Date:  12/27/19 Room / Location:  [facility] ENDOSCOPY 5 / [facility]    Anesthesia Start:  0953 Anesthesia Stop:  1014    Procedure:  COLONOSCOPY WITH ANESTHESIA (N/A ) Diagnosis:       Change in bowel function      (Change in bowel function [R19.8])    Surgeon:  Charan Salamanca DO Provider:  Sheila Cannon CRNA    Anesthesia Type:  MAC ASA Status:  2          Anesthesia Type: MAC    Vitals  Vitals Value Taken Time   BP 90/72 12/27/2019 10:25 AM   Temp     Pulse 82 12/27/2019 10:31 AM   Resp 15 12/27/2019 10:25 AM   SpO2 98 % 12/27/2019 10:31 AM   Vitals shown include unvalidated device data.        Post Anesthesia Care and Evaluation    Patient location during evaluation: PHASE II  Patient participation: complete - patient participated  Level of consciousness: awake and alert  Pain management: adequate  Airway patency: patent  Anesthetic complications: No anesthetic complications  PONV Status: none  Cardiovascular status: acceptable  Respiratory status: acceptable  Hydration status: acceptable

## 2019-12-27 NOTE — ANESTHESIA PREPROCEDURE EVALUATION
Anesthesia Evaluation     Patient summary reviewed and Nursing notes reviewed   no history of anesthetic complications:  NPO Solid Status: > 8 hours  NPO Liquid Status: > 2 hours           Airway   Mallampati: I  TM distance: >3 FB  Neck ROM: full  No difficulty expected  Dental - normal exam     Pulmonary - normal exam   (+) a smoker Current Smoked day of surgery,   (-) sleep apnea  Cardiovascular - negative cardio ROS and normal exam  Exercise tolerance: good (4-7 METS)    (-) hypertension, CAD      Neuro/Psych  (+) headaches,     (-) seizures, TIA, CVA  GI/Hepatic/Renal/Endo    (+)  GERD,  thyroid problem hypothyroidism    Musculoskeletal     Abdominal  - normal exam    Bowel sounds: normal.   Substance History - negative use     OB/GYN negative ob/gyn ROS         Other   arthritis,                        Anesthesia Plan    ASA 2     MAC     intravenous induction     Anesthetic plan, all risks, benefits, and alternatives have been provided, discussed and informed consent has been obtained with: patient.

## 2021-07-26 ENCOUNTER — APPOINTMENT (OUTPATIENT)
Dept: GENERAL RADIOLOGY | Facility: HOSPITAL | Age: 51
End: 2021-07-26

## 2021-07-26 ENCOUNTER — HOSPITAL ENCOUNTER (EMERGENCY)
Facility: HOSPITAL | Age: 51
Discharge: HOME | End: 2021-07-26
Admitting: EMERGENCY MEDICINE

## 2021-07-26 VITALS
WEIGHT: 188 LBS | DIASTOLIC BLOOD PRESSURE: 84 MMHG | TEMPERATURE: 98 F | BODY MASS INDEX: 33.31 KG/M2 | RESPIRATION RATE: 18 BRPM | SYSTOLIC BLOOD PRESSURE: 119 MMHG | OXYGEN SATURATION: 98 % | HEIGHT: 63 IN | HEART RATE: 80 BPM

## 2021-07-26 DIAGNOSIS — R00.2 PALPITATIONS: Primary | ICD-10-CM

## 2021-07-26 LAB
ALBUMIN SERPL-MCNC: 4.3 G/DL (ref 3.5–5.2)
ALBUMIN/GLOB SERPL: 2.2 G/DL
ALP SERPL-CCNC: 67 U/L (ref 39–117)
ALT SERPL W P-5'-P-CCNC: 13 U/L (ref 1–33)
ANION GAP SERPL CALCULATED.3IONS-SCNC: 7 MMOL/L (ref 5–15)
AST SERPL-CCNC: 11 U/L (ref 1–32)
BASOPHILS # BLD AUTO: 0.07 10*3/MM3 (ref 0–0.2)
BASOPHILS NFR BLD AUTO: 0.8 % (ref 0–1.5)
BILIRUB SERPL-MCNC: 0.3 MG/DL (ref 0–1.2)
BUN SERPL-MCNC: 13 MG/DL (ref 6–20)
BUN/CREAT SERPL: 19.1 (ref 7–25)
CALCIUM SPEC-SCNC: 9.1 MG/DL (ref 8.6–10.5)
CHLORIDE SERPL-SCNC: 108 MMOL/L (ref 98–107)
CO2 SERPL-SCNC: 26 MMOL/L (ref 22–29)
CREAT SERPL-MCNC: 0.68 MG/DL (ref 0.57–1)
D DIMER PPP FEU-MCNC: <0.22 MG/L (FEU) (ref 0–0.5)
DEPRECATED RDW RBC AUTO: 41.6 FL (ref 37–54)
EOSINOPHIL # BLD AUTO: 0.45 10*3/MM3 (ref 0–0.4)
EOSINOPHIL NFR BLD AUTO: 5.1 % (ref 0.3–6.2)
ERYTHROCYTE [DISTWIDTH] IN BLOOD BY AUTOMATED COUNT: 13 % (ref 12.3–15.4)
GFR SERPL CREATININE-BSD FRML MDRD: 92 ML/MIN/1.73
GLOBULIN UR ELPH-MCNC: 2 GM/DL
GLUCOSE SERPL-MCNC: 87 MG/DL (ref 65–99)
HCT VFR BLD AUTO: 35.9 % (ref 34–46.6)
HGB BLD-MCNC: 12.8 G/DL (ref 12–15.9)
IMM GRANULOCYTES # BLD AUTO: 0.02 10*3/MM3 (ref 0–0.05)
IMM GRANULOCYTES NFR BLD AUTO: 0.2 % (ref 0–0.5)
INR PPP: 1.05 (ref 0.91–1.09)
LYMPHOCYTES # BLD AUTO: 3.19 10*3/MM3 (ref 0.7–3.1)
LYMPHOCYTES NFR BLD AUTO: 36.3 % (ref 19.6–45.3)
MCH RBC QN AUTO: 31.3 PG (ref 26.6–33)
MCHC RBC AUTO-ENTMCNC: 35.7 G/DL (ref 31.5–35.7)
MCV RBC AUTO: 87.8 FL (ref 79–97)
MONOCYTES # BLD AUTO: 0.77 10*3/MM3 (ref 0.1–0.9)
MONOCYTES NFR BLD AUTO: 8.8 % (ref 5–12)
NEUTROPHILS NFR BLD AUTO: 4.29 10*3/MM3 (ref 1.7–7)
NEUTROPHILS NFR BLD AUTO: 48.8 % (ref 42.7–76)
NRBC BLD AUTO-RTO: 0 /100 WBC (ref 0–0.2)
NT-PROBNP SERPL-MCNC: 61.8 PG/ML (ref 0–900)
PLATELET # BLD AUTO: 293 10*3/MM3 (ref 140–450)
PMV BLD AUTO: 9.1 FL (ref 6–12)
POTASSIUM SERPL-SCNC: 3.8 MMOL/L (ref 3.5–5.2)
PROT SERPL-MCNC: 6.3 G/DL (ref 6–8.5)
PROTHROMBIN TIME: 12.9 SECONDS (ref 11.5–13.4)
RBC # BLD AUTO: 4.09 10*6/MM3 (ref 3.77–5.28)
SARS-COV-2 RNA PNL SPEC NAA+PROBE: NOT DETECTED
SODIUM SERPL-SCNC: 141 MMOL/L (ref 136–145)
TROPONIN T SERPL-MCNC: <0.01 NG/ML (ref 0–0.03)
TROPONIN T SERPL-MCNC: <0.01 NG/ML (ref 0–0.03)
TSH SERPL DL<=0.05 MIU/L-ACNC: 2.04 UIU/ML (ref 0.27–4.2)
WBC # BLD AUTO: 8.79 10*3/MM3 (ref 3.4–10.8)

## 2021-07-26 PROCEDURE — 83880 ASSAY OF NATRIURETIC PEPTIDE: CPT | Performed by: NURSE PRACTITIONER

## 2021-07-26 PROCEDURE — 93010 ELECTROCARDIOGRAM REPORT: CPT | Performed by: EMERGENCY MEDICINE

## 2021-07-26 PROCEDURE — 96374 THER/PROPH/DIAG INJ IV PUSH: CPT

## 2021-07-26 PROCEDURE — 93005 ELECTROCARDIOGRAM TRACING: CPT | Performed by: NURSE PRACTITIONER

## 2021-07-26 PROCEDURE — 71045 X-RAY EXAM CHEST 1 VIEW: CPT

## 2021-07-26 PROCEDURE — 84484 ASSAY OF TROPONIN QUANT: CPT | Performed by: NURSE PRACTITIONER

## 2021-07-26 PROCEDURE — 85610 PROTHROMBIN TIME: CPT | Performed by: NURSE PRACTITIONER

## 2021-07-26 PROCEDURE — 85379 FIBRIN DEGRADATION QUANT: CPT | Performed by: NURSE PRACTITIONER

## 2021-07-26 PROCEDURE — 99283 EMERGENCY DEPT VISIT LOW MDM: CPT

## 2021-07-26 PROCEDURE — 80053 COMPREHEN METABOLIC PANEL: CPT | Performed by: NURSE PRACTITIONER

## 2021-07-26 PROCEDURE — 84443 ASSAY THYROID STIM HORMONE: CPT | Performed by: NURSE PRACTITIONER

## 2021-07-26 PROCEDURE — 87635 SARS-COV-2 COVID-19 AMP PRB: CPT | Performed by: NURSE PRACTITIONER

## 2021-07-26 PROCEDURE — 93005 ELECTROCARDIOGRAM TRACING: CPT

## 2021-07-26 PROCEDURE — 85025 COMPLETE CBC W/AUTO DIFF WBC: CPT | Performed by: NURSE PRACTITIONER

## 2021-07-26 PROCEDURE — 25010000002 ONDANSETRON PER 1 MG: Performed by: NURSE PRACTITIONER

## 2021-07-26 RX ORDER — SODIUM CHLORIDE 0.9 % (FLUSH) 0.9 %
10 SYRINGE (ML) INJECTION AS NEEDED
Status: DISCONTINUED | OUTPATIENT
Start: 2021-07-26 | End: 2021-07-27 | Stop reason: HOSPADM

## 2021-07-26 RX ORDER — ONDANSETRON 2 MG/ML
4 INJECTION INTRAMUSCULAR; INTRAVENOUS ONCE
Status: COMPLETED | OUTPATIENT
Start: 2021-07-26 | End: 2021-07-26

## 2021-07-26 RX ORDER — ACETAMINOPHEN 500 MG
1000 TABLET ORAL ONCE
Status: COMPLETED | OUTPATIENT
Start: 2021-07-26 | End: 2021-07-26

## 2021-07-26 RX ADMIN — ONDANSETRON 4 MG: 2 INJECTION INTRAMUSCULAR; INTRAVENOUS at 22:26

## 2021-07-26 RX ADMIN — ACETAMINOPHEN 1000 MG: 500 TABLET, FILM COATED ORAL at 22:26

## 2021-07-28 ENCOUNTER — HOSPITAL ENCOUNTER (OUTPATIENT)
Dept: CARDIOLOGY | Facility: HOSPITAL | Age: 51
Discharge: HOME | End: 2021-07-28
Admitting: NURSE PRACTITIONER

## 2021-07-28 DIAGNOSIS — R00.2 PALPITATIONS: ICD-10-CM

## 2021-07-28 LAB
QT INTERVAL: 406 MS
QT INTERVAL: 432 MS
QTC INTERVAL: 438 MS
QTC INTERVAL: 488 MS

## 2021-07-28 PROCEDURE — 93225 XTRNL ECG REC<48 HRS REC: CPT

## 2021-08-03 ENCOUNTER — TELEPHONE (OUTPATIENT)
Dept: EMERGENCY DEPT | Facility: HOSPITAL | Age: 51
End: 2021-08-03

## 2021-08-03 LAB
MAXIMAL PREDICTED HEART RATE: 170 BPM
STRESS TARGET HR: 145 BPM

## 2021-08-03 PROCEDURE — 93227 XTRNL ECG REC<48 HR R&I: CPT | Performed by: INTERNAL MEDICINE

## 2021-08-03 NOTE — TELEPHONE ENCOUNTER
----- Message from DOTTIE Toro sent at 8/3/2021  1:23 PM CDT -----  Please let pt know normal study, thanks

## 2021-08-05 ENCOUNTER — TELEPHONE (OUTPATIENT)
Dept: EMERGENCY DEPT | Facility: HOSPITAL | Age: 51
End: 2021-08-05

## 2021-08-05 NOTE — TELEPHONE ENCOUNTER
----- Message from AB Jaramillo sent at 8/3/2021  2:16 PM CDT -----  Tell patient about normal holter monitor results.

## 2021-11-01 PROBLEM — R19.8 CHANGE IN BOWEL FUNCTION: Status: RESOLVED | Noted: 2019-12-05 | Resolved: 2021-11-01

## 2021-11-01 NOTE — PROGRESS NOTES
Chief Complaint   Patient presents with   • GI Problem     reflux worse had endo at [facility] not sure when but a long time ago she thinks       PCP: Fab Cruz MD  REFER: No ref. provider found    Subjective     HPI    Patient presents to office with complaints of  Increase in acid reflux, worse during night.  She is compliant with daily Dexilant (takes at hs, for over 5 years).  No dysphagia.  Occasional nausea/vomitting.  No abdominal pain.  She complain of RITCHIE abdominal pain.   Does not consume much caffeine.      CScope (Dr Salamanca) 2019-normal (10 years recall)       Past Medical History:   Diagnosis Date   • Arthritis    • Disease of thyroid gland    • GERD (gastroesophageal reflux disease)    • H/O tubal ligation    • Migraines        Past Surgical History:   Procedure Laterality Date   • APPENDECTOMY N/A 2018    Procedure: LAPAROSCOPIC APPENDECTOMY;  Surgeon: Yuliya Kumar MD;  Location: [facility] OR;  Service: General   •  SECTION      times  3   • CHOLECYSTECTOMY     • CHOLECYSTECTOMY WITH INTRAOPERATIVE CHOLANGIOGRAM N/A 2018    Procedure: LAPAROSCOPIC CHOLECYSTECTOMY;  Surgeon: Yuliya Kumar MD;  Location: [facility] OR;  Service: General   • COLONOSCOPY     • COLONOSCOPY N/A 2019    Procedure: COLONOSCOPY WITH ANESTHESIA;  Surgeon: Charan Salamanca DO;  Location: [facility] ENDOSCOPY;  Service: Gastroenterology   • CYSTOSCOPY W/ LASER LITHOTRIPSY     • ENDOSCOPY     • TENDON REPAIR      left hand   • TONSILLECTOMY         Outpatient Medications Marked as Taking for the 21 encounter (Office Visit) with Rodrigo Owens APRN   Medication Sig Dispense Refill   • amphetamine-dextroamphetamine (ADDERALL) 20 MG tablet Take 20 mg by mouth Daily.     • citalopram (CeleXA) 40 MG tablet Take 40 mg by mouth Daily.     • dexlansoprazole (DEXILANT) 30 MG capsule Take 30 mg by mouth Daily.     • levothyroxine (SYNTHROID, LEVOTHROID) 75 MCG tablet Take 75 mcg by mouth Daily.   "   • topiramate (TOPAMAX) 50 MG tablet Take 50 mg by mouth Daily.         Allergies   Allergen Reactions   • Clarithromycin Nausea And Vomiting and Mental Status Change   • Penicillins Unknown (See Comments)     Unknown childhood reaction       Social History     Socioeconomic History   • Marital status: Legally    Tobacco Use   • Smoking status: Current Every Day Smoker     Packs/day: 1.00     Years: 19.00     Pack years: 19.00     Types: Cigarettes   • Smokeless tobacco: Never Used   Substance and Sexual Activity   • Alcohol use: Yes     Comment: rarer   • Drug use: No   • Sexual activity: Defer       Review of Systems   Constitutional: Negative for unexpected weight change.   Respiratory: Negative for shortness of breath.    Cardiovascular: Negative for chest pain.   Gastrointestinal: Positive for abdominal pain. Negative for anal bleeding.       Objective     Vitals:    11/02/21 0938   BP: 120/80   Pulse: 82   Temp: 98 °F (36.7 °C)   SpO2: 100%   Weight: 82.1 kg (181 lb)   Height: 160 cm (63\")     Body mass index is 32.06 kg/m².    Physical Exam  Constitutional:       Appearance: Normal appearance. She is well-developed.   Eyes:      General: No scleral icterus.  Cardiovascular:      Rate and Rhythm: Regular rhythm.      Heart sounds: Normal heart sounds. No murmur heard.      Pulmonary:      Effort: Pulmonary effort is normal. No accessory muscle usage.      Breath sounds: Normal breath sounds.   Abdominal:      General: Bowel sounds are normal. There is no distension.      Palpations: Abdomen is soft. There is no mass.      Tenderness: There is no abdominal tenderness. There is no guarding or rebound.   Skin:     General: Skin is warm and dry.      Coloration: Skin is not jaundiced.   Neurological:      Mental Status: She is alert.   Psychiatric:         Behavior: Behavior is cooperative.         Imaging Results (Most Recent)     None          Body mass index is 32.06 kg/m².    Assessment/Plan "     Diagnoses and all orders for this visit:    1. Epigastric pain (Primary)    2. Gastroesophageal reflux disease, unspecified whether esophagitis present  -     Case Request; Standing  -     Case Request        ESOPHAGOGASTRODUODENOSCOPY WITH ANESTHESIA (N/A)    Take PPI 30 min prior to breakfast   Decrease caffeine, nicotine, etoh- all contribute to acid reflux  Do not eat 2-3 hours within lying down, elevated HOB 4-6 inches     Advised pt to stop use of NSAIDs, Fish Oil, and MV 5 days prior to procedure, per Dr Salamanca protocol.  Tylenol based products are ok to take.  Pt verbalized understanding.    Precautions are currently being put in place due to COVID-19.  I have explained to Fern Davis they will be required to undergo COVID testing prior to their procedure.  Fern Voran Ryan verbalized understanding and was willing to proceed.    The risk of the endoscopy were discussed in detail.  We discussed the risk of perforation (one out of 7090-4854, riskier with dilation), bleeding (one out of 500), and the rare risks of infection, adverse reaction to anesthesia, respiratory failure, cardiac failure including MI and adverse reaction to medications, etc.  We discussed consequences that could occur if a risk were to develop such as the need for hospitalization, blood transfusion, surgical intervention, medications, pain and disability and death.  Alternatives include not doing anything, or pursuing an UGI series which only offers a diagnosis with potential less accuracy compared to egd.  The patient verbalizes understanding and agrees to proceed.     Colonoscopy is up to date, procedure can be performed earlier if clinically indicated     Rodrigo Owens, APRN  11/02/21          There are no Patient Instructions on file for this visit.

## 2021-11-02 ENCOUNTER — OFFICE VISIT (OUTPATIENT)
Dept: GASTROENTEROLOGY | Facility: CLINIC | Age: 51
End: 2021-11-02

## 2021-11-02 VITALS
BODY MASS INDEX: 32.07 KG/M2 | DIASTOLIC BLOOD PRESSURE: 80 MMHG | SYSTOLIC BLOOD PRESSURE: 120 MMHG | OXYGEN SATURATION: 100 % | WEIGHT: 181 LBS | HEIGHT: 63 IN | TEMPERATURE: 98 F | HEART RATE: 82 BPM

## 2021-11-02 DIAGNOSIS — R10.13 EPIGASTRIC PAIN: Primary | ICD-10-CM

## 2021-11-02 DIAGNOSIS — K21.9 GASTROESOPHAGEAL REFLUX DISEASE, UNSPECIFIED WHETHER ESOPHAGITIS PRESENT: ICD-10-CM

## 2021-11-02 DIAGNOSIS — Z01.818 PREOPERATIVE TESTING: Primary | ICD-10-CM

## 2021-11-02 PROCEDURE — 99214 OFFICE O/P EST MOD 30 MIN: CPT | Performed by: NURSE PRACTITIONER

## 2021-11-03 ENCOUNTER — TELEPHONE (OUTPATIENT)
Dept: GASTROENTEROLOGY | Facility: CLINIC | Age: 51
End: 2021-11-03

## 2021-11-03 PROBLEM — K21.9 GASTROESOPHAGEAL REFLUX DISEASE: Status: ACTIVE | Noted: 2021-11-03

## 2021-11-03 NOTE — TELEPHONE ENCOUNTER
Please change date of colon recall from 10 years to 5 years due to family history     Dr Salamanca in agreement    Thank you

## 2021-11-16 ENCOUNTER — TELEPHONE (OUTPATIENT)
Dept: GASTROENTEROLOGY | Facility: CLINIC | Age: 51
End: 2021-11-16

## 2021-11-16 ENCOUNTER — APPOINTMENT (OUTPATIENT)
Dept: LAB | Facility: HOSPITAL | Age: 51
End: 2021-11-16

## 2021-11-16 NOTE — TELEPHONE ENCOUNTER
Patient spoke with Vandana and cancel procedure with Dr. Salamanca - Patient stated she has the flu and would call to rescheduled. Sent letter to Fab Cruz MD

## 2022-09-26 ENCOUNTER — TRANSCRIBE ORDERS (OUTPATIENT)
Dept: ADMINISTRATIVE | Facility: HOSPITAL | Age: 52
End: 2022-09-26

## 2022-09-26 DIAGNOSIS — Z98.890 HX OF LITHOTRIPSY: ICD-10-CM

## 2022-09-26 DIAGNOSIS — Z87.442 HISTORY OF KIDNEY STONES: ICD-10-CM

## 2022-09-26 DIAGNOSIS — R10.9 FLANK PAIN: Primary | ICD-10-CM

## 2022-10-10 ENCOUNTER — APPOINTMENT (OUTPATIENT)
Dept: CT IMAGING | Facility: HOSPITAL | Age: 52
End: 2022-10-10

## 2022-10-25 ENCOUNTER — HOSPITAL ENCOUNTER (OUTPATIENT)
Dept: CT IMAGING | Facility: HOSPITAL | Age: 52
Discharge: HOME | End: 2022-10-25
Admitting: ADVANCED PRACTICE MIDWIFE

## 2022-10-25 DIAGNOSIS — R10.9 FLANK PAIN: ICD-10-CM

## 2022-10-25 DIAGNOSIS — Z98.890 HX OF LITHOTRIPSY: ICD-10-CM

## 2022-10-25 DIAGNOSIS — Z87.442 HISTORY OF KIDNEY STONES: ICD-10-CM

## 2022-10-25 PROCEDURE — 74176 CT ABD & PELVIS W/O CONTRAST: CPT

## 2023-04-01 ENCOUNTER — APPOINTMENT (OUTPATIENT)
Dept: GENERAL RADIOLOGY | Age: 53
End: 2023-04-01
Payer: COMMERCIAL

## 2023-04-01 ENCOUNTER — HOSPITAL ENCOUNTER (EMERGENCY)
Age: 53
Discharge: HOME | End: 2023-04-01
Attending: EMERGENCY MEDICINE
Payer: COMMERCIAL

## 2023-04-01 ENCOUNTER — APPOINTMENT (OUTPATIENT)
Dept: CT IMAGING | Age: 53
End: 2023-04-01
Payer: COMMERCIAL

## 2023-04-01 VITALS
RESPIRATION RATE: 16 BRPM | BODY MASS INDEX: 30.91 KG/M2 | TEMPERATURE: 98.4 F | OXYGEN SATURATION: 99 % | SYSTOLIC BLOOD PRESSURE: 122 MMHG | WEIGHT: 168 LBS | HEIGHT: 62 IN | DIASTOLIC BLOOD PRESSURE: 68 MMHG | HEART RATE: 65 BPM

## 2023-04-01 DIAGNOSIS — U07.1 COVID-19: ICD-10-CM

## 2023-04-01 DIAGNOSIS — R10.30 LOWER ABDOMINAL PAIN: ICD-10-CM

## 2023-04-01 DIAGNOSIS — R19.7 DIARRHEA, UNSPECIFIED TYPE: Primary | ICD-10-CM

## 2023-04-01 LAB
ALBUMIN SERPL-MCNC: 3.9 G/DL (ref 3.5–5.2)
ALP SERPL-CCNC: 71 U/L (ref 35–104)
ALT SERPL-CCNC: 28 U/L (ref 5–33)
ANION GAP SERPL CALCULATED.3IONS-SCNC: 10 MMOL/L (ref 7–19)
AST SERPL-CCNC: 15 U/L (ref 5–32)
BASOPHILS # BLD: 0 K/UL (ref 0–0.2)
BASOPHILS NFR BLD: 0.9 % (ref 0–1)
BILIRUB SERPL-MCNC: <0.2 MG/DL (ref 0.2–1.2)
BUN SERPL-MCNC: 13 MG/DL (ref 6–20)
CALCIUM SERPL-MCNC: 8.6 MG/DL (ref 8.6–10)
CHLORIDE SERPL-SCNC: 109 MMOL/L (ref 98–111)
CO2 SERPL-SCNC: 25 MMOL/L (ref 22–29)
CREAT SERPL-MCNC: 0.7 MG/DL (ref 0.5–0.9)
EOSINOPHIL # BLD: 0.2 K/UL (ref 0–0.6)
EOSINOPHIL NFR BLD: 3.2 % (ref 0–5)
ERYTHROCYTE [DISTWIDTH] IN BLOOD BY AUTOMATED COUNT: 13.1 % (ref 11.5–14.5)
GLUCOSE SERPL-MCNC: 88 MG/DL (ref 74–109)
HCT VFR BLD AUTO: 38.9 % (ref 37–47)
HGB BLD-MCNC: 13.4 G/DL (ref 12–16)
IMM GRANULOCYTES # BLD: 0 K/UL
LYMPHOCYTES # BLD: 2 K/UL (ref 1.1–4.5)
LYMPHOCYTES NFR BLD: 42.2 % (ref 20–40)
MCH RBC QN AUTO: 30.2 PG (ref 27–31)
MCHC RBC AUTO-ENTMCNC: 34.4 G/DL (ref 33–37)
MCV RBC AUTO: 87.8 FL (ref 81–99)
MONOCYTES # BLD: 0.4 K/UL (ref 0–0.9)
MONOCYTES NFR BLD: 8.5 % (ref 0–10)
NEUTROPHILS # BLD: 2.1 K/UL (ref 1.5–7.5)
NEUTS SEG NFR BLD: 45 % (ref 50–65)
PLATELET # BLD AUTO: 281 K/UL (ref 130–400)
PMV BLD AUTO: 9.2 FL (ref 9.4–12.3)
POTASSIUM SERPL-SCNC: 3.8 MMOL/L (ref 3.5–5)
PROT SERPL-MCNC: 6.3 G/DL (ref 6.6–8.7)
RBC # BLD AUTO: 4.43 M/UL (ref 4.2–5.4)
SODIUM SERPL-SCNC: 144 MMOL/L (ref 136–145)
WBC # BLD AUTO: 4.7 K/UL (ref 4.8–10.8)

## 2023-04-01 PROCEDURE — 71045 X-RAY EXAM CHEST 1 VIEW: CPT

## 2023-04-01 PROCEDURE — 80053 COMPREHEN METABOLIC PANEL: CPT

## 2023-04-01 PROCEDURE — 85025 COMPLETE CBC W/AUTO DIFF WBC: CPT

## 2023-04-01 PROCEDURE — 36415 COLL VENOUS BLD VENIPUNCTURE: CPT

## 2023-04-01 PROCEDURE — 74177 CT ABD & PELVIS W/CONTRAST: CPT

## 2023-04-01 PROCEDURE — 96374 THER/PROPH/DIAG INJ IV PUSH: CPT

## 2023-04-01 PROCEDURE — 6360000004 HC RX CONTRAST MEDICATION: Performed by: EMERGENCY MEDICINE

## 2023-04-01 PROCEDURE — 99285 EMERGENCY DEPT VISIT HI MDM: CPT

## 2023-04-01 PROCEDURE — 6360000002 HC RX W HCPCS: Performed by: EMERGENCY MEDICINE

## 2023-04-01 PROCEDURE — 2580000003 HC RX 258: Performed by: EMERGENCY MEDICINE

## 2023-04-01 RX ORDER — SODIUM CHLORIDE, SODIUM LACTATE, POTASSIUM CHLORIDE, AND CALCIUM CHLORIDE .6; .31; .03; .02 G/100ML; G/100ML; G/100ML; G/100ML
1000 INJECTION, SOLUTION INTRAVENOUS ONCE
Status: COMPLETED | OUTPATIENT
Start: 2023-04-01 | End: 2023-04-01

## 2023-04-01 RX ORDER — ONDANSETRON 2 MG/ML
4 INJECTION INTRAMUSCULAR; INTRAVENOUS ONCE
Status: COMPLETED | OUTPATIENT
Start: 2023-04-01 | End: 2023-04-01

## 2023-04-01 RX ORDER — TRAZODONE HYDROCHLORIDE 50 MG/1
50 TABLET ORAL NIGHTLY
COMMUNITY

## 2023-04-01 RX ADMIN — SODIUM CHLORIDE, POTASSIUM CHLORIDE, SODIUM LACTATE AND CALCIUM CHLORIDE 1000 ML: 600; 310; 30; 20 INJECTION, SOLUTION INTRAVENOUS at 17:17

## 2023-04-01 RX ADMIN — ONDANSETRON 4 MG: 2 INJECTION INTRAMUSCULAR; INTRAVENOUS at 17:16

## 2023-04-01 RX ADMIN — IOPAMIDOL 90 ML: 755 INJECTION, SOLUTION INTRAVENOUS at 18:22

## 2023-04-01 ASSESSMENT — PAIN DESCRIPTION - ORIENTATION: ORIENTATION: LEFT

## 2023-04-01 ASSESSMENT — PAIN SCALES - GENERAL
PAINLEVEL_OUTOF10: 3
PAINLEVEL_OUTOF10: 5

## 2023-04-01 ASSESSMENT — PAIN - FUNCTIONAL ASSESSMENT
PAIN_FUNCTIONAL_ASSESSMENT: 0-10
PAIN_FUNCTIONAL_ASSESSMENT: 0-10

## 2023-04-01 ASSESSMENT — PAIN DESCRIPTION - LOCATION: LOCATION: FLANK

## 2023-04-02 NOTE — ED PROVIDER NOTES
[facility] EMERGENCY DEPT  eMERGENCY dEPARTMENT eNCOUnter      Pt Name: Raheem Flor  MRN: [id number]  [place] 1970  Date of evaluation: 2023  Provider: Demetrius Galvan MD    [address] [place] [address]       Chief Complaint   Patient presents with    Positive For Covid-19    Flank Pain           Diarrhea     Tested positive on Wednesday, left sided flank pain         HISTORY OF PRESENT ILLNESS   (Location/Symptom, Timing/Onset,Context/Setting, Quality, Duration, Modifying Factors, Severity)  Note limiting factors. Raheem Flor is a 46 y.o. female who presents to the emergency department ***     HPI    NursingNotes were reviewed. REVIEW OF SYSTEMS    (2-9 systems for level 4, 10 or more for level 5)     Review of Systems         PAST MEDICALHISTORY     Past Medical History:   Diagnosis Date    Anxiety     Headache     Kidney stone          SURGICAL HISTORY       Past Surgical History:   Procedure Laterality Date     SECTION      CYSTO/URETERO/PYELOSCOPY, CALCULUS [place] Left 2017    URETEROSCOPY STONE REMOVAL WITH BASKET PLACEMENT OF DOUBLE J STENT performed by Daljit Stock MD at [address] [place] [address]     Previous Medications    AMPHETAMINE-DEXTROAMPHETAMINE (ADDERALL) 15 MG TABLET    Take 30 mg by mouth daily. CITALOPRAM (CELEXA) 40 MG TABLET    Take 40 mg by mouth daily    DEXILANT 60 MG CPDR DELAYED RELEASE CAPSULE    TAKE ONE CAPSULE BY MOUTH EVERY DAY    HYDROXYZINE (VISTARIL) 50 MG CAPSULE    1 CAPSULE BY MOUTH TWICE A DAY AS NEEDED (PRN) ANXIETY    LEVOTHYROXINE (SYNTHROID) 75 MCG TABLET    Take 75 mcg by mouth Daily    LORAZEPAM (ATIVAN) 0.5 MG TABLET    Take 0.5 mg by mouth every 6 hours as needed for Anxiety    TOPIRAMATE (TOPAMAX) 50 MG TABLET    Take 50 mg by mouth 2 times daily    TRAZODONE (DESYREL) 50 MG TABLET    Take 50 mg by mouth nightly       ALLERGIES     Pcn [penicillins]    FAMILY HISTORY     History reviewed. No pertinent family history.        SOCIAL components:       Result Value    Total Protein 6.3 (*)     All other components within normal limits   CBC WITH AUTO DIFFERENTIAL - Abnormal; Notable for the following components:    WBC 4.7 (*)     MPV 9.2 (*)     Neutrophils % 45.0 (*)     Lymphocytes % 42.2 (*)     All other components within normal limits       All other labs were within normal range or not returned as of this dictation. EMERGENCY DEPARTMENT COURSE and DIFFERENTIAL DIAGNOSIS/MDM:   Vitals:    Vitals:    04/01/23 1701 04/01/23 1734 04/01/23 1829 04/01/23 1956   BP: 124/86 114/72 123/72 122/68   Pulse: 64 66 62 65   Resp: 17 18 17 16   Temp:       SpO2: 98% 98% 100% 99%   Weight:       Height:           MDM    I have independently reviewed patient's laboratory studies and CT imaging performed while present here in the emergency department. Serum electrolytes are unremarkable. WBC count looks okay at 4.7. CT scan of the abdomen pelvis does not demonstrate any acute intra-abdominal pathology. Patient previously tested +4 days ago for COVID-19 infection. She does have a prior history of asthma. We discussed outpatient treatment with Paxlovid. Patient was agreeable with this plan of care. Return precautions were reviewed and discussed and all questions been answered. PROCEDURES:  Unless otherwise noted below, none     Procedures    FINAL IMPRESSION      1. Diarrhea, unspecified type    2. Lower abdominal pain    3. COVID-19          DISPOSITION/PLAN   DISPOSITION Decision To Discharge 04/01/2023 07:28:17 PM      PATIENT REFERRED TO:  Jackie Juan MD  [address] [address]  [phone]            DISCHARGE MEDICATIONS:  Discharge Medication List as of 4/1/2023  7:50 PM        START taking these medications    Details   nirmatrelvir/ritonavir 300/100 (PAXLOVID) 20 x 150 MG & 10 x 100MG TBPK Take 3 tablets (two 150 mg nirmatrelvir and one 100 mg ritonavir tablets) by mouth every 12 hours for 5 days. , Disp-30 tablet,

## 2023-04-03 ASSESSMENT — ENCOUNTER SYMPTOMS
VOMITING: 0
DIARRHEA: 1
SHORTNESS OF BREATH: 0
ABDOMINAL PAIN: 1
COUGH: 0
NAUSEA: 0

## 2023-09-29 ENCOUNTER — APPOINTMENT (OUTPATIENT)
Dept: CT IMAGING | Age: 53
End: 2023-09-29
Payer: COMMERCIAL

## 2023-09-29 ENCOUNTER — HOSPITAL ENCOUNTER (EMERGENCY)
Age: 53
Discharge: HOME | End: 2023-09-29
Payer: COMMERCIAL

## 2023-09-29 VITALS
BODY MASS INDEX: 30.73 KG/M2 | TEMPERATURE: 98 F | HEART RATE: 73 BPM | RESPIRATION RATE: 17 BRPM | OXYGEN SATURATION: 99 % | SYSTOLIC BLOOD PRESSURE: 123 MMHG | WEIGHT: 168 LBS | DIASTOLIC BLOOD PRESSURE: 82 MMHG

## 2023-09-29 DIAGNOSIS — N20.1 URETERIC STONE: Primary | ICD-10-CM

## 2023-09-29 DIAGNOSIS — N20.1 LEFT URETERAL CALCULUS: ICD-10-CM

## 2023-09-29 DIAGNOSIS — R10.9 LEFT FLANK PAIN: ICD-10-CM

## 2023-09-29 LAB
ALBUMIN SERPL-MCNC: 4.4 G/DL (ref 3.5–5.2)
ALP SERPL-CCNC: 71 U/L (ref 35–104)
ALT SERPL-CCNC: 18 U/L (ref 5–33)
ANION GAP SERPL CALCULATED.3IONS-SCNC: 10 MMOL/L (ref 7–19)
AST SERPL-CCNC: 16 U/L (ref 5–32)
BACTERIA URNS QL MICRO: NEGATIVE /HPF
BASOPHILS # BLD: 0.1 K/UL (ref 0–0.2)
BASOPHILS NFR BLD: 0.9 % (ref 0–1)
BILIRUB SERPL-MCNC: <0.2 MG/DL (ref 0.2–1.2)
BILIRUB UR QL STRIP: NEGATIVE
BUN SERPL-MCNC: 16 MG/DL (ref 6–20)
CALCIUM SERPL-MCNC: 9.4 MG/DL (ref 8.6–10)
CHLORIDE SERPL-SCNC: 108 MMOL/L (ref 98–111)
CLARITY UR: CLEAR
CO2 SERPL-SCNC: 26 MMOL/L (ref 22–29)
COLOR UR: YELLOW
CREAT SERPL-MCNC: 0.7 MG/DL (ref 0.5–0.9)
CRYSTALS URNS MICRO: ABNORMAL /HPF
EOSINOPHIL # BLD: 0.5 K/UL (ref 0–0.6)
EOSINOPHIL NFR BLD: 4.8 % (ref 0–5)
EPI CELLS #/AREA URNS AUTO: 3 /HPF (ref 0–5)
ERYTHROCYTE [DISTWIDTH] IN BLOOD BY AUTOMATED COUNT: 13.5 % (ref 11.5–14.5)
GLUCOSE SERPL-MCNC: 107 MG/DL (ref 74–109)
GLUCOSE UR STRIP.AUTO-MCNC: NEGATIVE MG/DL
HCT VFR BLD AUTO: 37.4 % (ref 37–47)
HGB BLD-MCNC: 12.3 G/DL (ref 12–16)
HGB UR STRIP.AUTO-MCNC: ABNORMAL MG/L
HYALINE CASTS #/AREA URNS AUTO: 3 /HPF (ref 0–8)
IMM GRANULOCYTES # BLD: 0 K/UL
KETONES UR STRIP.AUTO-MCNC: NEGATIVE MG/DL
LEUKOCYTE ESTERASE UR QL STRIP.AUTO: ABNORMAL
LYMPHOCYTES # BLD: 2.6 K/UL (ref 1.1–4.5)
LYMPHOCYTES NFR BLD: 26.6 % (ref 20–40)
MCH RBC QN AUTO: 30.5 PG (ref 27–31)
MCHC RBC AUTO-ENTMCNC: 32.9 G/DL (ref 33–37)
MCV RBC AUTO: 92.8 FL (ref 81–99)
MONOCYTES # BLD: 0.7 K/UL (ref 0–0.9)
MONOCYTES NFR BLD: 6.6 % (ref 0–10)
NEUTROPHILS # BLD: 6 K/UL (ref 1.5–7.5)
NEUTS SEG NFR BLD: 60.8 % (ref 50–65)
NITRITE UR QL STRIP.AUTO: NEGATIVE
PH UR STRIP.AUTO: 5.5 [PH] (ref 5–8)
PLATELET # BLD AUTO: 398 K/UL (ref 130–400)
PMV BLD AUTO: 8.7 FL (ref 9.4–12.3)
POTASSIUM SERPL-SCNC: 3.8 MMOL/L (ref 3.5–5)
PROT SERPL-MCNC: 7.2 G/DL (ref 6.6–8.7)
PROT UR STRIP.AUTO-MCNC: NEGATIVE MG/DL
RBC # BLD AUTO: 4.03 M/UL (ref 4.2–5.4)
RBC #/AREA URNS AUTO: 53 /HPF (ref 0–4)
SODIUM SERPL-SCNC: 144 MMOL/L (ref 136–145)
SP GR UR STRIP.AUTO: 1.02 (ref 1–1.03)
UROBILINOGEN UR STRIP.AUTO-MCNC: 1 E.U./DL
WBC # BLD AUTO: 9.8 K/UL (ref 4.8–10.8)
WBC #/AREA URNS AUTO: 6 /HPF (ref 0–5)

## 2023-09-29 PROCEDURE — 96375 TX/PRO/DX INJ NEW DRUG ADDON: CPT

## 2023-09-29 PROCEDURE — 99284 EMERGENCY DEPT VISIT MOD MDM: CPT

## 2023-09-29 PROCEDURE — 2580000003 HC RX 258: Performed by: NURSE PRACTITIONER

## 2023-09-29 PROCEDURE — 6360000002 HC RX W HCPCS: Performed by: NURSE PRACTITIONER

## 2023-09-29 PROCEDURE — 74176 CT ABD & PELVIS W/O CONTRAST: CPT

## 2023-09-29 PROCEDURE — 85025 COMPLETE CBC W/AUTO DIFF WBC: CPT

## 2023-09-29 PROCEDURE — 80053 COMPREHEN METABOLIC PANEL: CPT

## 2023-09-29 PROCEDURE — 96374 THER/PROPH/DIAG INJ IV PUSH: CPT

## 2023-09-29 PROCEDURE — 81001 URINALYSIS AUTO W/SCOPE: CPT

## 2023-09-29 PROCEDURE — 6370000000 HC RX 637 (ALT 250 FOR IP): Performed by: NURSE PRACTITIONER

## 2023-09-29 PROCEDURE — 36415 COLL VENOUS BLD VENIPUNCTURE: CPT

## 2023-09-29 RX ORDER — HYDROMORPHONE HYDROCHLORIDE 1 MG/ML
1 INJECTION, SOLUTION INTRAMUSCULAR; INTRAVENOUS; SUBCUTANEOUS ONCE
Status: COMPLETED | OUTPATIENT
Start: 2023-09-29 | End: 2023-09-29

## 2023-09-29 RX ORDER — TAMSULOSIN HYDROCHLORIDE 0.4 MG/1
0.4 CAPSULE ORAL DAILY
Qty: 10 CAPSULE | Refills: 0 | Status: SHIPPED | OUTPATIENT
Start: 2023-09-29 | End: 2023-10-09

## 2023-09-29 RX ORDER — ONDANSETRON 2 MG/ML
4 INJECTION INTRAMUSCULAR; INTRAVENOUS ONCE
Status: COMPLETED | OUTPATIENT
Start: 2023-09-29 | End: 2023-09-29

## 2023-09-29 RX ORDER — OXYCODONE HYDROCHLORIDE AND ACETAMINOPHEN 5; 325 MG/1; MG/1
1 TABLET ORAL EVERY 6 HOURS PRN
Qty: 12 TABLET | Refills: 0 | Status: SHIPPED | OUTPATIENT
Start: 2023-09-29 | End: 2023-10-02

## 2023-09-29 RX ORDER — TAMSULOSIN HYDROCHLORIDE 0.4 MG/1
0.4 CAPSULE ORAL ONCE
Status: COMPLETED | OUTPATIENT
Start: 2023-09-29 | End: 2023-09-29

## 2023-09-29 RX ORDER — 0.9 % SODIUM CHLORIDE 0.9 %
1000 INTRAVENOUS SOLUTION INTRAVENOUS ONCE
Status: COMPLETED | OUTPATIENT
Start: 2023-09-29 | End: 2023-09-29

## 2023-09-29 RX ORDER — KETOROLAC TROMETHAMINE 30 MG/ML
30 INJECTION, SOLUTION INTRAMUSCULAR; INTRAVENOUS ONCE
Status: COMPLETED | OUTPATIENT
Start: 2023-09-29 | End: 2023-09-29

## 2023-09-29 RX ORDER — KETOROLAC TROMETHAMINE 10 MG/1
10 TABLET, FILM COATED ORAL 2 TIMES DAILY
Qty: 10 TABLET | Refills: 0 | Status: SHIPPED | OUTPATIENT
Start: 2023-09-29 | End: 2023-10-04

## 2023-09-29 RX ORDER — ONDANSETRON 4 MG/1
4 TABLET, FILM COATED ORAL 3 TIMES DAILY PRN
Qty: 15 TABLET | Refills: 0 | Status: SHIPPED | OUTPATIENT
Start: 2023-09-29

## 2023-09-29 RX ADMIN — KETOROLAC TROMETHAMINE 30 MG: 30 INJECTION, SOLUTION INTRAMUSCULAR; INTRAVENOUS at 14:51

## 2023-09-29 RX ADMIN — ONDANSETRON 4 MG: 2 INJECTION INTRAMUSCULAR; INTRAVENOUS at 14:50

## 2023-09-29 RX ADMIN — HYDROMORPHONE HYDROCHLORIDE 1 MG: 1 INJECTION, SOLUTION INTRAMUSCULAR; INTRAVENOUS; SUBCUTANEOUS at 17:08

## 2023-09-29 RX ADMIN — TAMSULOSIN HYDROCHLORIDE 0.4 MG: 0.4 CAPSULE ORAL at 17:08

## 2023-09-29 RX ADMIN — SODIUM CHLORIDE 1000 ML: 9 INJECTION, SOLUTION INTRAVENOUS at 14:50

## 2023-09-29 ASSESSMENT — PAIN SCALES - GENERAL
PAINLEVEL_OUTOF10: 10
PAINLEVEL_OUTOF10: 5

## 2023-09-29 NOTE — DISCHARGE INSTRUCTIONS
Drink plenty of water. Take medications as prescribed. Follow up with Urology on Monday. Return to ER for any new, worsening, or change in condition.

## 2023-09-29 NOTE — ED PROVIDER NOTES
[facility] EMERGENCY DE[facility]  eMERGENCY dEPARTMENT eNCOUnter      Pt Name: Mervin Chaudhry  MRN: [id number]  [address] 1970  Date of evaluation: 2023  Provider: LAVON Thomas NP    CHIEF COMPLAINT       Chief Complaint   Patient presents with    Flank Pain     Left side flank pain, patient has history of kidney stone          HISTORY OF PRESENT ILLNESS   (Location/Symptom, Timing/Onset,Context/Setting, Quality, Duration, Modifying Factors, Severity)  Note limiting factors. Mervin Chaudhry is a 48 y.o. female who presents to the emergency department with complaint of left flank pain. She denies pain radiation. She has a history of kidney stones and is fairly certain that she has another. She denies fever, chills, vomiting, blood in urine and diarrhea but endorses nausea. The history is provided by the patient. NursingNotes were reviewed. REVIEW OF SYSTEMS    (2-9 systems for level 4, 10 or more for level 5)     Review of Systems   Constitutional:  Negative for fever. Respiratory: Negative. Cardiovascular: Negative. Genitourinary:  Positive for flank pain. Negative for decreased urine volume, difficulty urinating, dysuria, frequency, hematuria and pelvic pain. Skin: Negative. All other systems reviewed and are negative.            PAST MEDICALHISTORY     Past Medical History:   Diagnosis Date    Anxiety     Headache     Kidney stone          SURGICAL HISTORY       Past Surgical History:   Procedure Laterality Date     SECTION      CYSTO/URETERO/PYELOSCOPY, CALCULUS [place] Left 2017    URETEROSCOPY STONE REMOVAL WITH BASKET PLACEMENT OF DOUBLE J STENT performed by Nicki Shannon MD at [address] [place] [address]     Discharge Medication List as of 2023  5:11 PM        CONTINUE these medications which have NOT CHANGED    Details   traZODone (DESYREL) 50 MG tablet Take 50 mg by mouth nightlyHistorical Med      hydrOXYzine (VISTARIL) 50 MG capsule 1

## 2023-09-30 ASSESSMENT — ENCOUNTER SYMPTOMS: RESPIRATORY NEGATIVE: 1

## 2024-05-21 ENCOUNTER — OFFICE VISIT (OUTPATIENT)
Dept: GASTROENTEROLOGY | Facility: CLINIC | Age: 54
End: 2024-05-21
Payer: COMMERCIAL

## 2024-05-21 VITALS
SYSTOLIC BLOOD PRESSURE: 126 MMHG | WEIGHT: 185 LBS | HEIGHT: 63 IN | DIASTOLIC BLOOD PRESSURE: 80 MMHG | HEART RATE: 82 BPM | TEMPERATURE: 97 F | BODY MASS INDEX: 32.78 KG/M2 | OXYGEN SATURATION: 98 %

## 2024-05-21 DIAGNOSIS — Z83.719 FAMILY HISTORY OF COLONIC POLYPS: ICD-10-CM

## 2024-05-21 DIAGNOSIS — K21.9 GASTROESOPHAGEAL REFLUX DISEASE, UNSPECIFIED WHETHER ESOPHAGITIS PRESENT: ICD-10-CM

## 2024-05-21 DIAGNOSIS — R13.10 DYSPHAGIA, UNSPECIFIED TYPE: Primary | ICD-10-CM

## 2024-05-21 PROCEDURE — 99214 OFFICE O/P EST MOD 30 MIN: CPT | Performed by: NURSE PRACTITIONER

## 2024-05-21 RX ORDER — PANTOPRAZOLE SODIUM 40 MG/1
1 TABLET, DELAYED RELEASE ORAL DAILY
COMMUNITY
Start: 2024-05-13

## 2024-05-21 RX ORDER — CARIPRAZINE 1.5 MG/1
1 CAPSULE, GELATIN COATED ORAL DAILY
COMMUNITY

## 2024-05-21 RX ORDER — TRAZODONE HYDROCHLORIDE 50 MG/1
50-100 TABLET ORAL
COMMUNITY

## 2024-05-21 NOTE — PROGRESS NOTES
Chief Complaint   Patient presents with    Difficulty Swallowing     Trouble swallowing getting choked on food also having bad reflux was taken off dexilant insurance won't pay for it reflux got worse        PCP: Fab Cruz MD  REFER: Felton Yang MD    Subjective     HPI    Fern Davis has history of acid reflux that is not controlled by daily Protonix.  She was taking Dexilant without any symptoms.  Dexilant stopped due to insurance lack of payment, symptoms started after stopping Dexilant. She has been referred to office for evaluation of complains of dysphagia ongoing x 3-4 months.  PPI changed 9-12 months ago.   Fern Davis has trouble swallowing solid food, no trouble with liquid.  Symptoms felt in mid to lower esophagus. Throws up during night (acid, chunks of food). She does not regurgitate after eating. .    No bright red blood per rectum, no melena.    No change in bowel habit (move irregular, this is normal).   Denies frequent use of NSAIDs.   EGD over 10 years ago    Started semaglutide apx 4 weeks ago.  She does not feel symptoms are worse since initiation of medication.    COLONOSCOPY (2019 09:52) -10 years     Past Medical History:   Diagnosis Date    Arthritis     Disease of thyroid gland     GERD (gastroesophageal reflux disease)     H/O tubal ligation     Migraines        Past Surgical History:   Procedure Laterality Date    APPENDECTOMY N/A 2018    Procedure: LAPAROSCOPIC APPENDECTOMY;  Surgeon: Yuliya Kumar MD;  Location: [facility] OR;  Service: General     SECTION      times  3    CHOLECYSTECTOMY      CHOLECYSTECTOMY WITH INTRAOPERATIVE CHOLANGIOGRAM N/A 2018    Procedure: LAPAROSCOPIC CHOLECYSTECTOMY;  Surgeon: Yuliya Kumar MD;  Location: [facility] OR;  Service: General    COLONOSCOPY      COLONOSCOPY N/A 2019    Procedure: COLONOSCOPY WITH ANESTHESIA;  Surgeon: Charan Salamanca DO;  Location: [facility] ENDOSCOPY;  Service:  Gastroenterology    CYSTOSCOPY W/ LASER LITHOTRIPSY      ENDOSCOPY      TENDON REPAIR      left hand    TONSILLECTOMY         Outpatient Medications Marked as Taking for the 5/21/24 encounter (Office Visit) with Rodrigo Owens APRN   Medication Sig Dispense Refill    amphetamine-dextroamphetamine (ADDERALL) 20 MG tablet Take 1 tablet by mouth Daily.      citalopram (CeleXA) 40 MG tablet Take 1 tablet by mouth Daily.      HYDROcodone-acetaminophen (NORCO) 7.5-325 MG per tablet Take 1 tablet by mouth Every 4 (Four) Hours As Needed for Moderate Pain  (Pain). 30 tablet 0    levothyroxine (SYNTHROID, LEVOTHROID) 75 MCG tablet Take 1 tablet by mouth Daily.      pantoprazole (PROTONIX) 40 MG EC tablet Take 1 tablet by mouth Daily.      topiramate (TOPAMAX) 50 MG tablet Take 1 tablet by mouth Daily.      traZODone (DESYREL) 50 MG tablet Take 1-2 tablets by mouth every night at bedtime.      Vraylar 1.5 MG capsule capsule Take 1 capsule by mouth Daily.         Allergies   Allergen Reactions    Clarithromycin Nausea And Vomiting and Mental Status Change    Penicillins Unknown (See Comments)     Unknown childhood reaction       Social History     Socioeconomic History    Marital status:    Tobacco Use    Smoking status: Former     Current packs/day: 1.00     Average packs/day: 1 pack/day for 19.0 years (19.0 ttl pk-yrs)     Types: Cigarettes    Smokeless tobacco: Never   Vaping Use    Vaping status: Former   Substance and Sexual Activity    Alcohol use: Not Currently    Drug use: No    Sexual activity: Defer       Review of Systems   Constitutional:  Negative for fever and unexpected weight change.   HENT:  Positive for trouble swallowing.    Respiratory:  Negative for shortness of breath.    Cardiovascular:  Negative for chest pain.   Gastrointestinal:  Negative for abdominal pain and anal bleeding.       Objective     Vitals:    05/21/24 1230   BP: 126/80   Pulse: 82   Temp: 97 °F (36.1 °C)   SpO2: 98%  "  Weight: 83.9 kg (185 lb)   Height: 160 cm (63\")     Body mass index is 32.77 kg/m².    Physical Exam  Constitutional:       Appearance: Normal appearance. She is well-developed.   Eyes:      General: No scleral icterus.  Cardiovascular:      Heart sounds: Normal heart sounds. No murmur heard.  Pulmonary:      Effort: Pulmonary effort is normal.   Abdominal:      General: Bowel sounds are normal. There is no distension.      Palpations: Abdomen is soft.      Tenderness: There is no abdominal tenderness. There is no guarding.   Skin:     General: Skin is warm and dry.      Coloration: Skin is not jaundiced.   Neurological:      Mental Status: She is alert.   Psychiatric:         Behavior: Behavior is cooperative.         Imaging Results (Most Recent)       None            Body mass index is 32.77 kg/m².    Assessment & Plan     Diagnoses and all orders for this visit:    1. Dysphagia, unspecified type (Primary)  -     Case Request; Standing  -     Case Request    2. Gastroesophageal reflux disease, unspecified whether esophagitis present    Other orders  -     Implement Anesthesia Orders Day of Procedure; Standing  -     Obtain Informed Consent; Standing         ESOPHAGOGASTRODUODENOSCOPY WITH ANESTHESIA (N/A)    Take PPI 30 min prior to breakfast   Decrease caffeine, nicotine, etoh- all contribute to acid reflux  Do not eat 2-3 hours within lying down, elevated HOB 4-6 inches     Continue Protonix as previously prescribed until EGD.  After EGD start Voquenza (samples given).  Will plan on sending script to pharmacy. (Scheduled for next Tue)    Colonoscopy is up to date, procedure can be performed earlier if clinically indicated     Advised pt to stop use of NSAIDs, Fish Oil, and MV 5 days prior to procedure, per Dr Salamanca protocol.  Tylenol based products are ok to take.  Pt verbalized understanding.    The risks of the endoscopy were discussed in detail.  We discussed the risks of perforation (one out of " [id number], riskier with dilation), bleeding (one out of 500), and the rare risks of infection, adverse reaction to anesthesia, respiratory failure, cardiac failure including MI and adverse reaction to medications, etc.  We discussed consequences that could occur if a risk were to develop such as the need for hospitalization, blood transfusion, surgical intervention, medications, pain and disability and death.  Alternatives include not doing anything, or pursuing an UGI series which only offers a diagnosis with potential less accuracy compared to EGD.  The patient verbalizes understanding and agrees to proceed.     Fern Davis asked to hold Semaglutide (Ozempic, Wegovy, Rybelsus) x 7 days prior to procedure per The American Society of Anesthesia guidelines      Rodrigo Owens, APRN  05/22/24          There are no Patient Instructions on file for this visit.

## 2024-05-28 ENCOUNTER — ANESTHESIA EVENT (OUTPATIENT)
Dept: GASTROENTEROLOGY | Facility: HOSPITAL | Age: 54
End: 2024-05-28
Payer: COMMERCIAL

## 2024-05-28 ENCOUNTER — TELEPHONE (OUTPATIENT)
Dept: GASTROENTEROLOGY | Facility: CLINIC | Age: 54
End: 2024-05-28
Payer: COMMERCIAL

## 2024-05-28 ENCOUNTER — HOSPITAL ENCOUNTER (OUTPATIENT)
Facility: HOSPITAL | Age: 54
Setting detail: HOSPITAL OUTPATIENT SURGERY
Discharge: HOME | End: 2024-05-28
Attending: INTERNAL MEDICINE | Admitting: INTERNAL MEDICINE
Payer: COMMERCIAL

## 2024-05-28 ENCOUNTER — ANESTHESIA (OUTPATIENT)
Dept: GASTROENTEROLOGY | Facility: HOSPITAL | Age: 54
End: 2024-05-28
Payer: COMMERCIAL

## 2024-05-28 VITALS
SYSTOLIC BLOOD PRESSURE: 111 MMHG | DIASTOLIC BLOOD PRESSURE: 68 MMHG | OXYGEN SATURATION: 96 % | RESPIRATION RATE: 17 BRPM | HEART RATE: 66 BPM | BODY MASS INDEX: 32.78 KG/M2 | WEIGHT: 185 LBS | TEMPERATURE: 97.5 F | HEIGHT: 63 IN

## 2024-05-28 DIAGNOSIS — R13.10 DYSPHAGIA, UNSPECIFIED TYPE: ICD-10-CM

## 2024-05-28 PROCEDURE — 87081 CULTURE SCREEN ONLY: CPT | Performed by: INTERNAL MEDICINE

## 2024-05-28 PROCEDURE — 25810000003 SODIUM CHLORIDE 0.9 % SOLUTION: Performed by: ANESTHESIOLOGY

## 2024-05-28 PROCEDURE — 43239 EGD BIOPSY SINGLE/MULTIPLE: CPT | Performed by: INTERNAL MEDICINE

## 2024-05-28 PROCEDURE — 25010000002 PROPOFOL 10 MG/ML EMULSION

## 2024-05-28 RX ORDER — SODIUM CHLORIDE 9 MG/ML
500 INJECTION, SOLUTION INTRAVENOUS CONTINUOUS PRN
Status: DISCONTINUED | OUTPATIENT
Start: 2024-05-28 | End: 2024-05-28 | Stop reason: HOSPADM

## 2024-05-28 RX ORDER — PROPOFOL 10 MG/ML
VIAL (ML) INTRAVENOUS AS NEEDED
Status: DISCONTINUED | OUTPATIENT
Start: 2024-05-28 | End: 2024-05-28 | Stop reason: SURG

## 2024-05-28 RX ORDER — LIDOCAINE HYDROCHLORIDE 20 MG/ML
INJECTION, SOLUTION EPIDURAL; INFILTRATION; INTRACAUDAL; PERINEURAL AS NEEDED
Status: DISCONTINUED | OUTPATIENT
Start: 2024-05-28 | End: 2024-05-28 | Stop reason: SURG

## 2024-05-28 RX ORDER — SODIUM CHLORIDE 0.9 % (FLUSH) 0.9 %
10 SYRINGE (ML) INJECTION AS NEEDED
Status: DISCONTINUED | OUTPATIENT
Start: 2024-05-28 | End: 2024-05-28 | Stop reason: HOSPADM

## 2024-05-28 RX ADMIN — PROPOFOL 80 MG: 10 INJECTION, EMULSION INTRAVENOUS at 11:03

## 2024-05-28 RX ADMIN — LIDOCAINE HYDROCHLORIDE 100 MG: 20 INJECTION, SOLUTION EPIDURAL; INFILTRATION; INTRACAUDAL; PERINEURAL at 11:03

## 2024-05-28 RX ADMIN — SODIUM CHLORIDE 500 ML: 9 INJECTION, SOLUTION INTRAVENOUS at 10:49

## 2024-05-28 NOTE — ANESTHESIA PREPROCEDURE EVALUATION
Anesthesia Evaluation     Patient summary reviewed   no history of anesthetic complications:   NPO Solid Status: > 8 hours             Airway   Mallampati: II  Dental      Pulmonary    (+) a smoker vape,  Cardiovascular - negative cardio ROS  Exercise tolerance: excellent (>7 METS)        Neuro/Psych- negative ROS  GI/Hepatic/Renal/Endo    (+) obesity, GERD, thyroid problem hypothyroidism    Musculoskeletal     Abdominal    Substance History      OB/GYN          Other                    Anesthesia Plan    ASA 2     MAC       Anesthetic plan, risks, benefits, and alternatives have been provided, discussed and informed consent has been obtained with: patient.    CODE STATUS:

## 2024-05-28 NOTE — ANESTHESIA POSTPROCEDURE EVALUATION
"Patient: Fern Davis    Procedure Summary       Date: 05/28/24 Room / Location: [facility] ENDOSCOPY 4 / [facility] ENDOSCOPY    Anesthesia Start: 1103 Anesthesia Stop: 1111    Procedure: ESOPHAGOGASTRODUODENOSCOPY WITH ANESTHESIA Diagnosis:       Dysphagia, unspecified type      (Dysphagia, unspecified type [R13.10])    Surgeons: Charan Salamanca DO Provider: Drew Anderson CRNA    Anesthesia Type: MAC ASA Status: 2            Anesthesia Type: MAC    Vitals  Vitals Value Taken Time   BP     Temp     Pulse 71 05/28/24 1111   Resp     SpO2 97 % 05/28/24 1111   Vitals shown include unfiled device data.        Post Anesthesia Care and Evaluation    Patient location during evaluation: PACU  Patient participation: complete - patient participated  Level of consciousness: awake and alert  Pain management: adequate    Airway patency: patent  Anesthetic complications: No anesthetic complications    Cardiovascular status: acceptable  Respiratory status: acceptable  Hydration status: acceptable    Comments: Blood pressure 128/78, pulse 67, temperature 97.5 °F (36.4 °C), temperature source Temporal, resp. rate 18, height 160 cm (63\"), weight 83.9 kg (185 lb), SpO2 100%, not currently breastfeeding.    Pt discharged from PACU based on tae score >8    "

## 2024-05-28 NOTE — H&P
[facility] Gastroenterology  Pre Procedure History & Physical    Chief Complaint:   Dysphagia    Subjective     HPI:   Dysphagia    Past Medical History:   Past Medical History:   Diagnosis Date    Arthritis     Disease of thyroid gland     GERD (gastroesophageal reflux disease)     H/O tubal ligation     Migraines        Past Surgical History:  Past Surgical History:   Procedure Laterality Date    APPENDECTOMY N/A 2018    Procedure: LAPAROSCOPIC APPENDECTOMY;  Surgeon: Yuliya Kumar MD;  [facility];  Service: General     SECTION      times  3    CHOLECYSTECTOMY      CHOLECYSTECTOMY WITH INTRAOPERATIVE CHOLANGIOGRAM N/A 2018    Procedure: LAPAROSCOPIC CHOLECYSTECTOMY;  Surgeon: Yuliya Kumar MD;  Location: [facility] OR;  Service: General    COLONOSCOPY      COLONOSCOPY N/A 2019    Procedure: COLONOSCOPY WITH ANESTHESIA;  Surgeon: Charan Salamanca DO;  Location: [facility] ENDOSCOPY;  Service: Gastroenterology    CYSTOSCOPY W/ LASER LITHOTRIPSY      ENDOSCOPY      TENDON REPAIR      left hand    TONSILLECTOMY         Family History:  Family History   Problem Relation Age of Onset    Diverticulitis Father     Colon polyps Father     Colon polyps Mother     Colon cancer Neg Hx     Esophageal cancer Neg Hx        Social History:   reports that she has quit smoking. Her smoking use included cigarettes. She has a 19 pack-year smoking history. She has never used smokeless tobacco. She reports that she does not currently use alcohol. She reports that she does not use drugs.    Medications:   Prior to Admission medications    Medication Sig Start Date End Date Taking? Authorizing Provider   amphetamine-dextroamphetamine (ADDERALL) 20 MG tablet Take 1 tablet by mouth Daily.   Yes ProviderJavy MD   citalopram (CeleXA) 40 MG tablet Take 1 tablet by mouth Daily.   Yes ProviderJavy MD   levothyroxine (SYNTHROID, LEVOTHROID) 75 MCG tablet Take 1 tablet by mouth Daily.   Yes  "Javy Tyler MD   pantoprazole (PROTONIX) 40 MG EC tablet Take 1 tablet by mouth Daily. 5/13/24  Yes Javy Tyler MD   topiramate (TOPAMAX) 50 MG tablet Take 1 tablet by mouth Daily.   Yes Javy Tyler MD   traZODone (DESYREL) 50 MG tablet Take 1-2 tablets by mouth every night at bedtime.   Yes Javy Tyler MD   Vraylar 1.5 MG capsule capsule Take 1 capsule by mouth Daily.   Yes Javy Tyler MD   dexlansoprazole (DEXILANT) 30 MG capsule Take 30 mg by mouth Daily.  Patient not taking: Reported on 5/21/2024    Javy Tyler MD   HYDROcodone-acetaminophen (NORCO) 7.5-325 MG per tablet Take 1 tablet by mouth Every 4 (Four) Hours As Needed for Moderate Pain  (Pain). 8/31/18   Yuliya Kumar MD   Semaglutide-Weight Management 0.25 MG/0.5ML solution auto-injector Inject 0.5 mL under the skin into the appropriate area as directed 1 (One) Time Per Week.    ProviderJavy MD       Allergies:  Clarithromycin and Penicillins    ROS:    General: Weight stable  Resp: No SOA  Cardiovascular: No CP    Objective     Blood pressure 128/78, pulse 67, temperature 97.5 °F (36.4 °C), temperature source Temporal, resp. rate 18, height 160 cm (63\"), weight 83.9 kg (185 lb), SpO2 100%, not currently breastfeeding.    Physical Exam   Constitutional: Pt is oriented to person, place, and in no distress.   HENT: Mouth/Throat: Oropharynx is clear.   Cardiovascular: Normal rate, regular rhythm.    Pulmonary/Chest: Effort normal. No respiratory distress. No  wheezes.   Abdominal: Soft. Non-distended.  Skin: Skin is warm and dry.   Psychiatric: Mood, memory, affect and judgment appear normal.     Assessment & Plan     Diagnosis:  Dysphagia    Anticipated Surgical Procedure:  EGD    The risks, benefits, and alternatives of this procedure have been discussed with the patient or the responsible party- the patient understands and agrees to proceed.        "

## 2024-05-29 LAB — UREASE TISS QL: NEGATIVE

## 2024-05-30 NOTE — TELEPHONE ENCOUNTER
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 She asked for us to give her a call in JULY to schedule once she has her school schedule.     Thank you !

## 2024-06-24 ENCOUNTER — TELEPHONE (OUTPATIENT)
Dept: GASTROENTEROLOGY | Facility: CLINIC | Age: 54
End: 2024-06-24
Payer: COMMERCIAL

## 2024-06-24 NOTE — TELEPHONE ENCOUNTER
Patient called and stated the voquezna is working for her.she would like a prescription sent to her pharmacy [facility].

## 2024-06-26 RX ORDER — VONOPRAZAN FUMARATE 26.72 MG/1
20 TABLET ORAL DAILY
Qty: 30 TABLET | Refills: 1 | Status: SHIPPED | OUTPATIENT
Start: 2024-06-26

## 2024-07-30 DIAGNOSIS — K21.00 GASTROESOPHAGEAL REFLUX DISEASE WITH ESOPHAGITIS WITHOUT HEMORRHAGE: Primary | ICD-10-CM

## 2024-07-30 PROBLEM — R13.10 DYSPHAGIA: Status: RESOLVED | Noted: 2024-05-21 | Resolved: 2024-07-30

## 2024-08-30 ENCOUNTER — HOSPITAL ENCOUNTER (OUTPATIENT)
Facility: HOSPITAL | Age: 54
Setting detail: HOSPITAL OUTPATIENT SURGERY
Discharge: HOME | End: 2024-08-30
Attending: INTERNAL MEDICINE | Admitting: INTERNAL MEDICINE
Payer: COMMERCIAL

## 2024-08-30 ENCOUNTER — ANESTHESIA (OUTPATIENT)
Dept: GASTROENTEROLOGY | Facility: HOSPITAL | Age: 54
End: 2024-08-30
Payer: COMMERCIAL

## 2024-08-30 ENCOUNTER — ANESTHESIA EVENT (OUTPATIENT)
Dept: GASTROENTEROLOGY | Facility: HOSPITAL | Age: 54
End: 2024-08-30
Payer: COMMERCIAL

## 2024-08-30 VITALS
RESPIRATION RATE: 21 BRPM | HEIGHT: 63 IN | HEART RATE: 80 BPM | OXYGEN SATURATION: 96 % | WEIGHT: 183 LBS | TEMPERATURE: 97.4 F | SYSTOLIC BLOOD PRESSURE: 132 MMHG | BODY MASS INDEX: 32.43 KG/M2 | DIASTOLIC BLOOD PRESSURE: 77 MMHG

## 2024-08-30 DIAGNOSIS — K21.00 GASTROESOPHAGEAL REFLUX DISEASE WITH ESOPHAGITIS WITHOUT HEMORRHAGE: ICD-10-CM

## 2024-08-30 PROCEDURE — 25010000002 PROPOFOL 10 MG/ML EMULSION: Performed by: NURSE ANESTHETIST, CERTIFIED REGISTERED

## 2024-08-30 PROCEDURE — 25810000003 SODIUM CHLORIDE 0.9 % SOLUTION: Performed by: NURSE ANESTHETIST, CERTIFIED REGISTERED

## 2024-08-30 PROCEDURE — 43239 EGD BIOPSY SINGLE/MULTIPLE: CPT | Performed by: INTERNAL MEDICINE

## 2024-08-30 PROCEDURE — 88305 TISSUE EXAM BY PATHOLOGIST: CPT | Performed by: INTERNAL MEDICINE

## 2024-08-30 RX ORDER — PROPOFOL 10 MG/ML
VIAL (ML) INTRAVENOUS AS NEEDED
Status: DISCONTINUED | OUTPATIENT
Start: 2024-08-30 | End: 2024-08-30 | Stop reason: SURG

## 2024-08-30 RX ORDER — SODIUM CHLORIDE 9 MG/ML
500 INJECTION, SOLUTION INTRAVENOUS CONTINUOUS PRN
Status: DISCONTINUED | OUTPATIENT
Start: 2024-08-30 | End: 2024-08-30 | Stop reason: HOSPADM

## 2024-08-30 RX ORDER — SODIUM CHLORIDE 0.9 % (FLUSH) 0.9 %
10 SYRINGE (ML) INJECTION AS NEEDED
Status: DISCONTINUED | OUTPATIENT
Start: 2024-08-30 | End: 2024-08-30 | Stop reason: HOSPADM

## 2024-08-30 RX ADMIN — SODIUM CHLORIDE 500 ML: 9 INJECTION, SOLUTION INTRAVENOUS at 07:51

## 2024-08-30 RX ADMIN — PROPOFOL 150 MG: 10 INJECTION, EMULSION INTRAVENOUS at 08:59

## 2024-08-30 NOTE — H&P
[facility] Gastroenterology  Pre Procedure History & Physical    Chief Complaint:   gERD    Subjective     HPI:   gERd    Past Medical History:   Past Medical History:   Diagnosis Date    Arthritis     Disease of thyroid gland     GERD (gastroesophageal reflux disease)     H/O tubal ligation     Migraines        Past Surgical History:  Past Surgical History:   Procedure Laterality Date    APPENDECTOMY N/A 2018    Procedure: LAPAROSCOPIC APPENDECTOMY;  Surgeon: Yuliya Kumar MD;  Location: [facility] OR;  Service: General     SECTION      times  3    CHOLECYSTECTOMY      CHOLECYSTECTOMY WITH INTRAOPERATIVE CHOLANGIOGRAM N/A 2018    Procedure: LAPAROSCOPIC CHOLECYSTECTOMY;  Surgeon: Yuliya Kumar MD;  Location: [facility] OR;  Service: General    COLONOSCOPY      COLONOSCOPY N/A 2019    Procedure: COLONOSCOPY WITH ANESTHESIA;  Surgeon: Charan Salamanca DO;  Location: [facility] ENDOSCOPY;  Service: Gastroenterology    CYSTOSCOPY W/ LASER LITHOTRIPSY      ENDOSCOPY      ENDOSCOPY N/A 2024    Procedure: ESOPHAGOGASTRODUODENOSCOPY WITH ANESTHESIA;  Surgeon: Charan Salamanca DO;  Location: [facility] ENDOSCOPY;  Service: Gastroenterology;  Laterality: N/A;  pre op dysphagia  post esophagitis   pcp Fab Pederson    TENDON REPAIR      left hand    TONSILLECTOMY         Family History:  Family History   Problem Relation Age of Onset    Diverticulitis Father     Colon polyps Father     Colon polyps Mother     Colon cancer Neg Hx     Esophageal cancer Neg Hx        Social History:   reports that she has quit smoking. Her smoking use included cigarettes. She has a 19 pack-year smoking history. She has never used smokeless tobacco. She reports that she does not currently use alcohol. She reports that she does not use drugs.    Medications:   Prior to Admission medications    Medication Sig Start Date End Date Taking? Authorizing Provider   amphetamine-dextroamphetamine (ADDERALL) 20 MG tablet Take 1  "tablet by mouth Daily.   Yes Javy Tyler MD   citalopram (CeleXA) 40 MG tablet Take 1 tablet by mouth Daily.   Yes Javy Tyler MD   levothyroxine (SYNTHROID, LEVOTHROID) 75 MCG tablet Take 1 tablet by mouth Daily.   Yes Javy Tyler MD   topiramate (TOPAMAX) 50 MG tablet Take 1 tablet by mouth Daily.   Yes Javy Tyler MD   traZODone (DESYREL) 50 MG tablet Take 1-2 tablets by mouth every night at bedtime.   Yes Javy Tyler MD   Vraylar 1.5 MG capsule capsule Take 1 capsule by mouth Daily.   Yes Javy Tyler MD   HYDROcodone-acetaminophen (NORCO) 7.5-325 MG per tablet Take 1 tablet by mouth Every 4 (Four) Hours As Needed for Moderate Pain  (Pain). 8/31/18   Yuliya Kumar MD   Semaglutide-Weight Management 0.25 MG/0.5ML solution auto-injector Inject 0.5 mL under the skin into the appropriate area as directed 1 (One) Time Per Week.    ProviderJavy MD   Vonoprazan Fumarate (Voquezna) 20 MG tablet Take 1 tablet by mouth Daily. 6/26/24   Rodrigo Owens, DOTTIE       Allergies:  Clarithromycin and Penicillins    ROS:    General: Weight stable  Resp: No SOA  Cardiovascular: No CP    Objective     Blood pressure 131/80, pulse 84, temperature 97.4 °F (36.3 °C), temperature source Temporal, resp. rate 20, height 160 cm (63\"), weight 83 kg (183 lb), SpO2 97%, not currently breastfeeding.    Physical Exam   Constitutional: Pt is oriented to person, place, and in no distress.   HENT: Mouth/Throat: Oropharynx is clear.   Cardiovascular: Normal rate, regular rhythm.    Pulmonary/Chest: Effort normal. No respiratory distress. No  wheezes.   Abdominal: Soft. Non-distended.  Skin: Skin is warm and dry.   Psychiatric: Mood, memory, affect and judgment appear normal.     Assessment & Plan     Diagnosis:  GERD    Anticipated Surgical Procedure:  EGD    The risks, benefits, and alternatives of this procedure have been discussed with the patient or the responsible " party- the patient understands and agrees to proceed.

## 2024-08-30 NOTE — ANESTHESIA POSTPROCEDURE EVALUATION
"Patient: Fern Davis    Procedure Summary       Date: 08/30/24 Room / Location: [facility] ENDOSCOPY 4 / [facility] ENDOSCOPY    Anesthesia Start: 0856 Anesthesia Stop: 0907    Procedure: ESOPHAGOGASTRODUODENOSCOPY WITH ANESTHESIA Diagnosis:       Gastroesophageal reflux disease with esophagitis without hemorrhage      (Gastroesophageal reflux disease with esophagitis without hemorrhage [K21.00])    Surgeons: Charan Salamanca DO Provider: Guy Simpson CRNA    Anesthesia Type: MAC ASA Status: 2            Anesthesia Type: MAC    Vitals  Vitals Value Taken Time   /71 08/30/24 0921   Temp     Pulse 80 08/30/24 0923   Resp 15 08/30/24 0915   SpO2 97 % 08/30/24 0922   Vitals shown include unfiled device data.        Post Anesthesia Care and Evaluation    Patient location during evaluation: PACU  Patient participation: complete - patient participated  Level of consciousness: awake and alert  Pain management: adequate    Airway patency: patent  Anesthetic complications: No anesthetic complications    Cardiovascular status: acceptable  Respiratory status: acceptable  Hydration status: acceptable    Comments: Blood pressure 113/80, pulse 82, temperature 97.4 °F (36.3 °C), temperature source Temporal, resp. rate 15, height 160 cm (63\"), weight 83 kg (183 lb), SpO2 97%, not currently breastfeeding.    Pt discharged from PACU based on tae score >8    "

## 2024-09-03 LAB
LAB AP CASE REPORT: NORMAL
Lab: NORMAL
PATH REPORT.FINAL DX SPEC: NORMAL
PATH REPORT.GROSS SPEC: NORMAL

## 2024-09-19 ENCOUNTER — TELEPHONE (OUTPATIENT)
Dept: GASTROENTEROLOGY | Facility: CLINIC | Age: 54
End: 2024-09-19
Payer: COMMERCIAL

## (undated) DEVICE — YANKAUER,BULB TIP WITH VENT: Brand: ARGYLE

## (undated) DEVICE — FRCP BX RADJAW4 NDL 2.8 240 STD OG

## (undated) DEVICE — LAPAROSCOPIC MONOPOLAR CORD: Brand: VALLEYLAB

## (undated) DEVICE — CONMED SCOPE SAVER BITE BLOCK, 20X27 MM: Brand: SCOPE SAVER

## (undated) DEVICE — SENSR O2 OXIMAX FNGR A/ 18IN NONSTR

## (undated) DEVICE — ENDOPATH XCEL DILATING TIP TROCARS WITH STABILITY SLEEVES: Brand: ENDOPATH XCEL

## (undated) DEVICE — ENDOPATH XCEL WITH OPTIVIEW TECHNOLOGY DILATING TIP TROCARS WITH STABILITY SLEEVES: Brand: ENDOPATH XCEL OPTIVIEW

## (undated) DEVICE — PK TURNOVER RM ADV

## (undated) DEVICE — Device: Brand: DEFENDO AIR/WATER/SUCTION AND BIOPSY VALVE

## (undated) DEVICE — TOWEL,OR,DSP,ST,BLUE,STD,4/PK,20PK/CS: Brand: MEDLINE

## (undated) DEVICE — ENDOPATH XCEL WITH OPTIVIEW TECHNOLOGY UNIVERSAL TROCAR STABILITY SLEEVES: Brand: ENDOPATH XCEL OPTIVIEW

## (undated) DEVICE — THE CHANNEL CLEANING BRUSH IS A NYLON FLEXI BRUSH ATTACHED TO A FLEXIBLE PLASTIC SHEATH DESIGNED TO SAFELY REMOVE DEBRIS FROM FLEXIBLE ENDOSCOPES.

## (undated) DEVICE — APPL CHLORAPREP W/TINT 26ML ORNG

## (undated) DEVICE — TOTAL TRAY, 16FR 10ML SIL FOLEY, URN: Brand: MEDLINE

## (undated) DEVICE — 2, DISPOSABLE SUCTION/IRRIGATOR WITHOUT DISPOSABLE TIP: Brand: STRYKEFLOW

## (undated) DEVICE — 3M™ STERI-STRIP™ REINFORCED ADHESIVE SKIN CLOSURES, R1546, 1/4 IN X 4 IN (6 MM X 100 MM), 10 STRIPS/ENVELOPE: Brand: 3M™ STERI-STRIP™

## (undated) DEVICE — CUFF,BP,DISP,1 TUBE,ADULT,HP: Brand: MEDLINE

## (undated) DEVICE — TBG SMPL FLTR LINE NASL 02/C02 A/ BX/100

## (undated) DEVICE — MONOPOLAR METZENBAUM SCISSOR, MINI BLADE TIP, DISPOSABLE: Brand: MONOPOLAR METZENBAUM SCISSOR, MINI BLADE TIP, DISPOSABLE

## (undated) DEVICE — ENDOPATH PNEUMONEEDLE INSUFFLATION NEEDLES WITH LUER LOCK CONNECTORS 120MM: Brand: ENDOPATH

## (undated) DEVICE — SUT VIC 0 UR6 27IN VCP603H

## (undated) DEVICE — ANTIBACTERIAL UNDYED BRAIDED (POLYGLACTIN 910), SYNTHETIC ABSORBABLE SUTURE: Brand: COATED VICRYL

## (undated) DEVICE — PAD GRND REM POLYHESIVE A/ DISP

## (undated) DEVICE — ENDOPATH ETS-FLEX45 ARTICULATING ENDOSCOPIC LINEAR CUTTER, NO RELOAD: Brand: ENDOPATH

## (undated) DEVICE — GLV SURG BIOGEL M LTX PF 7 1/2

## (undated) DEVICE — MASK,OXYGEN,MED CONC,ADLT,7' TUB, UC: Brand: PENDING

## (undated) DEVICE — ENDOGATOR AUXILIARY WATER JET CONNECTOR: Brand: ENDOGATOR

## (undated) DEVICE — CLTH CLENS READYCLEANSE PERI CARE PK/5

## (undated) DEVICE — PAD LAP CHOLE: Brand: MEDLINE INDUSTRIES, INC.